# Patient Record
Sex: FEMALE | Race: WHITE | Employment: FULL TIME | URBAN - METROPOLITAN AREA
[De-identification: names, ages, dates, MRNs, and addresses within clinical notes are randomized per-mention and may not be internally consistent; named-entity substitution may affect disease eponyms.]

---

## 2018-05-19 ENCOUNTER — HOSPITAL ENCOUNTER (OUTPATIENT)
Dept: MAMMOGRAPHY | Age: 53
Discharge: HOME | End: 2018-05-19
Attending: PHYSICIAN ASSISTANT
Payer: COMMERCIAL

## 2018-05-19 DIAGNOSIS — Z12.31 ENCOUNTER FOR SCREENING MAMMOGRAM FOR BREAST CANCER: ICD-10-CM

## 2018-05-19 PROCEDURE — 77067 SCR MAMMO BI INCL CAD: CPT

## 2018-05-31 ENCOUNTER — HOSPITAL ENCOUNTER (OUTPATIENT)
Dept: MAMMOGRAPHY | Age: 53
Discharge: HOME | End: 2018-05-31
Attending: PHYSICIAN ASSISTANT
Payer: COMMERCIAL

## 2018-05-31 DIAGNOSIS — R92.8 ABNORMAL SCREENING MAMMOGRAM: ICD-10-CM

## 2018-05-31 PROCEDURE — 76642 ULTRASOUND BREAST LIMITED: CPT

## 2018-05-31 PROCEDURE — 77065 DX MAMMO INCL CAD UNI: CPT

## 2019-05-09 ENCOUNTER — ANESTHESIA EVENT (OUTPATIENT)
Dept: ENDOSCOPY | Age: 54
End: 2019-05-09
Payer: COMMERCIAL

## 2019-05-09 RX ORDER — SODIUM CHLORIDE, SODIUM LACTATE, POTASSIUM CHLORIDE, CALCIUM CHLORIDE 600; 310; 30; 20 MG/100ML; MG/100ML; MG/100ML; MG/100ML
100 INJECTION, SOLUTION INTRAVENOUS CONTINUOUS
Status: CANCELLED | OUTPATIENT
Start: 2019-05-09

## 2019-05-10 ENCOUNTER — HOSPITAL ENCOUNTER (OUTPATIENT)
Age: 54
Setting detail: OUTPATIENT SURGERY
Discharge: HOME | End: 2019-05-10
Attending: SURGERY | Admitting: SURGERY
Payer: COMMERCIAL

## 2019-05-10 ENCOUNTER — ANESTHESIA (OUTPATIENT)
Dept: ENDOSCOPY | Age: 54
End: 2019-05-10
Payer: COMMERCIAL

## 2019-05-10 VITALS
SYSTOLIC BLOOD PRESSURE: 148 MMHG | TEMPERATURE: 98.6 F | RESPIRATION RATE: 16 BRPM | BODY MASS INDEX: 31.39 KG/M2 | WEIGHT: 200 LBS | HEIGHT: 67 IN | DIASTOLIC BLOOD PRESSURE: 75 MMHG | OXYGEN SATURATION: 100 % | HEART RATE: 47 BPM

## 2019-05-10 PROCEDURE — 74011250636 HC RX REV CODE- 250/636

## 2019-05-10 PROCEDURE — 74011250636 HC RX REV CODE- 250/636: Performed by: ANESTHESIOLOGY

## 2019-05-10 PROCEDURE — 76040000025: Performed by: SURGERY

## 2019-05-10 PROCEDURE — 76060000032 HC ANESTHESIA 0.5 TO 1 HR: Performed by: SURGERY

## 2019-05-10 PROCEDURE — 77030009426 HC FCPS BIOP ENDOSC BSC -B: Performed by: SURGERY

## 2019-05-10 PROCEDURE — 88305 TISSUE EXAM BY PATHOLOGIST: CPT

## 2019-05-10 RX ORDER — SODIUM CHLORIDE, SODIUM LACTATE, POTASSIUM CHLORIDE, CALCIUM CHLORIDE 600; 310; 30; 20 MG/100ML; MG/100ML; MG/100ML; MG/100ML
100 INJECTION, SOLUTION INTRAVENOUS CONTINUOUS
Status: DISCONTINUED | OUTPATIENT
Start: 2019-05-10 | End: 2019-05-10 | Stop reason: HOSPADM

## 2019-05-10 RX ORDER — PROPOFOL 10 MG/ML
INJECTION, EMULSION INTRAVENOUS
Status: DISCONTINUED | OUTPATIENT
Start: 2019-05-10 | End: 2019-05-10 | Stop reason: HOSPADM

## 2019-05-10 RX ORDER — LIDOCAINE HYDROCHLORIDE 20 MG/ML
INJECTION, SOLUTION EPIDURAL; INFILTRATION; INTRACAUDAL; PERINEURAL AS NEEDED
Status: DISCONTINUED | OUTPATIENT
Start: 2019-05-10 | End: 2019-05-10 | Stop reason: HOSPADM

## 2019-05-10 RX ORDER — PROPOFOL 10 MG/ML
INJECTION, EMULSION INTRAVENOUS AS NEEDED
Status: DISCONTINUED | OUTPATIENT
Start: 2019-05-10 | End: 2019-05-10 | Stop reason: HOSPADM

## 2019-05-10 RX ADMIN — LIDOCAINE HYDROCHLORIDE 60 MG: 20 INJECTION, SOLUTION EPIDURAL; INFILTRATION; INTRACAUDAL; PERINEURAL at 12:32

## 2019-05-10 RX ADMIN — SODIUM CHLORIDE, SODIUM LACTATE, POTASSIUM CHLORIDE, AND CALCIUM CHLORIDE 100 ML/HR: 600; 310; 30; 20 INJECTION, SOLUTION INTRAVENOUS at 12:21

## 2019-05-10 RX ADMIN — PROPOFOL 200 MCG/KG/MIN: 10 INJECTION, EMULSION INTRAVENOUS at 12:32

## 2019-05-10 RX ADMIN — PROPOFOL 50 MG: 10 INJECTION, EMULSION INTRAVENOUS at 12:32

## 2019-05-10 NOTE — ROUTINE PROCESS
VSS at discharge. No complaints noted. Education reviewed and signed with patient and partner. Pt discharged via wheelchair by sesar Watson. Patient to be driven home by partner.

## 2019-05-10 NOTE — H&P
History and Physical 
 
Patient: Александр Morris Physician: Shilpa Renner MD 
 
Referring Physician: KAVITHA Miles Chief Complaint: For colonoscopy History of Present Illness: Pt presents for colonoscopy. Initial screening. History: 
Past Medical History:  
Diagnosis Date  H/O tooth extraction 04/29/2019  
 and bone graft for possible implant at a later date. Finished Amoxicillin 5/8/19 and no longer taking Hydrocodone for pain. Past Surgical History:  
Procedure Laterality Date  HX CYST INCISION AND DRAINAGE Right   
 breast cyst, U/S 10/2009  HX HEENT  04/30/2019  
 oral surgery (tooth extraction, bone graft for possible implant) Social History Socioeconomic History  Marital status: SINGLE Spouse name: Not on file  Number of children: Not on file  Years of education: Not on file  Highest education level: Not on file Tobacco Use  Smoking status: Never Smoker  Smokeless tobacco: Never Used Substance and Sexual Activity  Alcohol use: Yes Comment: a beer or glass of wine on weekend (or none at all)  Drug use: Never Family History Problem Relation Age of Onset  Hypertension Father  Diabetes Father 71  
 Heart Disease Father 79  Cancer Father   
     prostate cancer  Diabetes Sister  Hypertension Sister  Cancer Maternal Grandmother   
     ovarian cancer  Breast Cancer Paternal Grandfather   
     and prostate cancer  Breast Cancer Paternal Aunt Medications:  
Prior to Admission medications Medication Sig Start Date End Date Taking? Authorizing Provider  
cetirizine (ZYRTEC) 10 mg tablet Take 10 mg by mouth nightly. Yes Provider, Historical  
ibuprofen (MOTRIN) 200 mg tablet Take  by mouth as needed for Pain. Provider, Historical  
 
 
Allergies: No Known Allergies Physical Exam:  
 
Vital Signs:  
Visit Vitals /84 Pulse (!) 52 Temp 99.2 °F (37.3 °C) Resp 16  
 Ht 5' 7\" (1.702 m) Wt 200 lb (90.7 kg) SpO2 98% BMI 31.32 kg/m² Mliss Rausch General: no distress Heart: regular Lungs: unlabored Abdominal: soft Neurological: Grossly normal  
  
 
Findings/Diagnosis: Screening for colorectal cancer Plan of Care/Planned Procedure: Colonoscopy, possible polypectomy. Pt/designee has reviewed the colonoscopy information sheet. Any questions have been discussed. They agree to proceed. Signed: 
Kemar Vega MD 
 5/10/2019

## 2019-05-10 NOTE — ANESTHESIA PREPROCEDURE EVALUATION
Relevant Problems No relevant active problems Anesthetic History No history of anesthetic complications Review of Systems / Medical History Pertinent labs reviewed Pulmonary Within defined limits Neuro/Psych Within defined limits Cardiovascular Within defined limits Exercise tolerance: >4 METS 
  
GI/Hepatic/Renal 
Within defined limits Endo/Other Obesity Other Findings Physical Exam 
 
Airway Mallampati: I 
TM Distance: 4 - 6 cm Neck ROM: normal range of motion Mouth opening: Normal 
 
 Cardiovascular Regular rate and rhythm,  S1 and S2 normal,  no murmur, click, rub, or gallop Dental 
No notable dental hx 
 
Comments: Recent tooth extraction. Pulmonary Breath sounds clear to auscultation Abdominal 
GI exam deferred Other Findings Anesthetic Plan ASA: 2 Anesthesia type: total IV anesthesia Induction: Intravenous Anesthetic plan and risks discussed with: Patient

## 2019-05-10 NOTE — DISCHARGE INSTRUCTIONS
Radha Gold M.D.  [phone]    Instructions following colonoscopy:    ACTIVITY:   Resume usual, basic activities around the house today.  You may be light-headed or sleepy from anesthesia, so be careful going up and down stairs.  Avoid driving, operating machinery, or signing documents for 24 hours. DIET:   No restriction. Please note, some people may have nausea or cramps after this procedure which can result in an upset stomach after eating.  Many people have loose stools or diarrhea immediately after colonoscopy. It is also not uncommon to not have a bowel movement for 2-3 days. PAIN:   Some cramping or gas pain is normal after colonoscopy. However, if you experience worsening pain over the course of the day, or pain with associated fever please call the office immediately      [address] IF:   You have a temperature higher than 101.5° Fahrenheit for more than 6 hours.  You have severe nausea or vomiting not relieved by medication; or diarrhea. Please continue home medications as previously prescribed.     Repeat colonoscopy in 5-10 years depending on lab results (office will follow-up)

## 2019-05-10 NOTE — ANESTHESIA POSTPROCEDURE EVALUATION
Procedure(s): 
COLONOSCOPY/ 32 
ENDOSCOPIC POLYPECTOMY. total IV anesthesia Anesthesia Post Evaluation Patient location during evaluation: PACU Patient participation: complete - patient participated Level of consciousness: awake Pain management: satisfactory to patient Airway patency: patent Anesthetic complications: no 
Cardiovascular status: hemodynamically stable Respiratory status: spontaneous ventilation Hydration status: euvolemic Post anesthesia nausea and vomiting:  none Vitals Value Taken Time /75 5/10/2019  1:31 PM  
Temp 37 °C (98.6 °F) 5/10/2019 12:59 PM  
Pulse 47 5/10/2019  1:31 PM  
Resp 16 5/10/2019  1:31 PM  
SpO2 100 % 5/10/2019  1:31 PM

## 2019-05-10 NOTE — PROCEDURES
Procedure in Detail:  Informed consent was obtained for the procedure. The patient was placed in the left lateral decubitus position and sedation was induced by anesthesia. The [id number] was inserted into the rectum and advanced under direct vision to the cecum, which was identified by the ileocecal valve and appendiceal orifice. The quality of the colonic preparation was excellent. A careful inspection was made as the colonoscope was withdrawn, including a retroflexed view of the rectum; findings and interventions are described below. Appropriate photodocumentation was obtained. Findings:   Rectum:   Normal  Sigmoid:   Normal  Descending Colon:   Normal  Transverse Colon:   Normal  Ascending Colon:     - Protruding lesions:     -Sessile Polyp(s) size 3 mm removed by polypectomy (hot biopsy)  Cecum:   Normal          Specimens: Specimens were collected and sent to pathology. Complications: None; patient tolerated the procedure well. \    EBL - none    Recommendations:   - Await pathology. - If adenoma is present, repeat colonoscopy in 5 years.      Signed By: Horacio Jorge MD                        May 10, 2019

## 2019-06-03 ENCOUNTER — HOSPITAL ENCOUNTER (OUTPATIENT)
Dept: MAMMOGRAPHY | Age: 54
Discharge: HOME | End: 2019-06-03
Attending: PHYSICIAN ASSISTANT
Payer: COMMERCIAL

## 2019-06-03 DIAGNOSIS — Z12.31 ENCOUNTER FOR SCREENING MAMMOGRAM FOR BREAST CANCER: ICD-10-CM

## 2019-06-03 PROCEDURE — 77067 SCR MAMMO BI INCL CAD: CPT

## 2020-06-24 ENCOUNTER — HOSPITAL ENCOUNTER (OUTPATIENT)
Dept: MAMMOGRAPHY | Age: 55
Discharge: HOME | End: 2020-06-24
Attending: PHYSICIAN ASSISTANT
Payer: COMMERCIAL

## 2020-06-24 DIAGNOSIS — Z12.31 SCREENING MAMMOGRAM FOR HIGH-RISK PATIENT: ICD-10-CM

## 2020-06-24 PROCEDURE — 77063 BREAST TOMOSYNTHESIS BI: CPT

## 2022-03-08 ENCOUNTER — HOSPITAL ENCOUNTER (OUTPATIENT)
Dept: PHYSICAL THERAPY | Age: 57
Discharge: HOME | End: 2022-03-08
Payer: COMMERCIAL

## 2022-03-08 PROCEDURE — 97161 PT EVAL LOW COMPLEX 20 MIN: CPT

## 2022-03-08 PROCEDURE — 97110 THERAPEUTIC EXERCISES: CPT

## 2022-03-08 NOTE — THERAPY EVALUATION
Maicol Torres  : 1965 [address] [place] [address].  [phone]   [phone]        OUTPATIENT PHYSICAL THERAPY:Initial Assessment 3/8/2022         ICD-10: Treatment Diagnosis: Pain in right knee (M25.561) and Stiffness of right knee, not elsewhere classified (M25.661) and Difficulty in walking, not elsewhere classified (R26.2)  Precautions/Allergies:   Patient has no known allergies. Fall Risk Score:     Ambulatory/Rehab Services H2 Model Falls Risk Assessment    Risk Factors:       No Risk Factors Identified Ability to Rise from Chair:       (0)  Ability to rise in a single movement    Falls Prevention Plan:       No modifications necessary   Total: (5 or greater = High Risk): 0     [facility] [place]. All Rights Reserved. [facility] Patent #[id number]. Federal Law prohibits the replication, distribution or use without written permission from [facility] of [address] [place] [address]     MD Orders: Direct access for Physical Therapy MEDICAL/REFERRING DIAGNOSIS:  Pain in right knee [M25.561]   DATE OF ONSET: 2022  REFERRING PHYSICIAN: Referred, Self, MD  RETURN PHYSICIAN APPOINTMENT: n/a     INITIAL ASSESSMENT:  Ms. 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 Pain is currently diminishing but prone to exacerbations. She comes to PT to resolve these symptoms and requires skilled PT to restore normal knee function. PROBLEM LIST (Impacting functional limitations):  1. Decreased Strength  2. Decreased ADL/Functional Activities  3. Decreased Ambulation Ability/Technique  4. Increased Pain  5. Decreased Flexibility/Joint Mobility  6. Decreased [place] with Home Exercise Program INTERVENTIONS PLANNED:  1. Cryotherapy  2. Electrical Stimulation  3. Home Exercise Program (HEP)  4. Manual Therapy  5.  Range of Motion (ROM)  6. Therapeutic Activites  7. Therapeutic Exercise/Strengthening    TREATMENT PLAN:  Effective Dates: 3/8/2022 TO 4/7/2022 (30 days). Frequency/Duration: 2 times a week for 30 Days  GOALS: (Goals have been discussed and agreed upon with patient.)  Short-Term Functional Goals: Time Frame: 2 weeks  1. Independent performance of home exercise program  2. Increase R knee flexion to 145 deg flexion for normal squat capacity  3. Pt to demonstrate normal/symmetrical squat technique. Discharge Goals: Time Frame: 4 weeks  1. Improve R quadriceps and calf strength 25% to allow more symmetrical squat capacity for lifting  2. Single leg balance 45 seconds or better for knee stance stability  3. Improve LEFS score to 65/80 or bettter to reflect normal age appropriate knee capacity  Rehabilitation Potential For Stated Goals: Good  Regarding Radha Genao's therapy, I certify that the treatment plan above will be carried out by a therapist or under their direction. Thank you for this referral,  Drew Macdonald PT       Referring Physician Signature: Referred, Self, MD              Date                      HISTORY:   History of Present Injury/Illness (Reason for Referral):  Pt reports developing right posterolateral knee pain in early January 2022 - this was the result of repetitive squatting while remodeling a bathroom in her home. Pain led her to a orthopedic surgical consult, with her having been diagnosed with a hamstring strain. Since the last week, her symptoms have lessened considerably but are prone to flare-up if she pushes her activities. She notes that she has had difficulty with stair descent (descends with a step-to pattern). She comes to PT to resolve these symptoms and restore more normal knee function. Past Medical History/Comorbidities:   Ms. Carey Cruz  has a past medical history of H/O tooth extraction (04/29/2019).     She has no past medical history of Aneurysm ([address] Arrhythmia, Arthritis, Asthma, Autoimmune disease ([facility] CAD (coronary artery disease), Cancer ([facility] [address] [facility] Chronic kidney disease, Chronic obstructive pulmonary disease ([facility] [address] Chronic pain, Coagulation disorder ([facility] [address] [facility] Diabetes ([address] [facility] [address] [facility] Endocarditis, GERD (gastroesophageal reflux disease), Heart failure ([facility] [address] [facility] Hypertension, Liver disease, Morbid obesity ([facility] [address] [facility] Psychiatric disorder, PUD (peptic ulcer disease), Rheumatic fever, Seizures ([facility] [address] [facility] Sleep apnea, Stroke ([facility] [address] [facility] Thromboembolus ([facility] [address] or Thyroid disease. Ms. Sunny Vasquez  has a past surgical history that includes hx cyst incision and drainage (Right); hx heent (04/30/2019); pr colsc flx w/removal lesion by hot bx forceps (5/10/2019); colonoscopy (N/A, 5/10/2019); and hx breast biopsy. Social History/Living Environment:     Pt . Home has only 3 steps at rear entrance  Prior Level of Function/Work/Activity:  Works as a , work requires driving. Hopes to resume fitness activities once knee feels better. Dominant Side:         RIGHT  Current Medications:  No current outpatient medications on file. Zyrted  Date Last Reviewed:  3/8/2022   # of Personal Factors/Comorbidities that affect the Plan of Care: 1-2: MODERATE COMPLEXITY   EXAMINATION:   Observation/Orthostatic Postural Assessment:    Pt stands with mild genu-valgus and bilateral knee hyperextension postures. Pelvic obliquity (left iliac crest lower). Palpation:    Mild medial joint line tenderness bilaterally. Right lateral gastrocnemius head/popliteal tenderness. ROM:    Knee flexion/extension: left = 145 to +5, right = 138 deg to +5 deg. Hamstring flexibility (SLR): left = 65 deg, right = 50 deg. Dorsiflexion rocker (knee flexed, weightbearing): left = 44 deg, right = 45 deg. Strength:   Single leg calf endurance: 10 reps each side, but with forward rock over toes. HHD: quadriceps: left = 26.7 kg, right = 17 kg. Hamstrings: left = 19.2 kg, right = 18 kg.    Special Tests:    Mild clicks with both left/right knee Deborah tests. Mild pseudolaxity with knee varus stressing L/R knees at 0 and 30 deg. Normal PF mobility. Neg. Bounce home, neg Lachmann tests bilat. Neurological Screen:  Normal LE light touch sensation bilat. Functional Mobility:   Performs sit - stand without hand assist. Performs half squat with weight shift to LLE. Balance:    Single leg balance = 15 secs each L/R, R side test performed with increased sway. Body Structures Involved:  1. Nerves  2. Bones  3. Joints  4. Muscles  5. Ligaments Body Functions Affected:  1. Sensory/Pain  2. Neuromusculoskeletal  3. Movement Related Activities and Participation Affected:  1. General Tasks and Demands  2. Mobility  3. Self Care  4. Domestic Life  5. Interpersonal Interactions and Relationships  6. Community, Social and [place]   # of elements that affect the Plan of Care: 1-2: LOW COMPLEXITY   CLINICAL PRESENTATION:   Presentation: Stable and uncomplicated: LOW COMPLEXITY   CLINICAL DECISION MAKING:   Tool Used: Lower Extremity Functional Scale (LEFS)  Score:  Initial: 53/80 Most Recent: X/80 (Date: -- )   Interpretation of Score: 20 questions each scored on a 5 point scale with 0 representing \"extreme difficulty or unable to perform\" and 4 representing \"no difficulty\". The lower the score, the greater the functional disability. 80/80 represents no disability. Minimal detectable change is 9 points. Medical Necessity:   · Patient is expected to demonstrate progress in strength, range of motion, balance and coordination  ·  to increase independence with gait, transfers, stair climbing and squats, restore normal ambulatory function. · .  Reason for Services/Other Comments:  · Patient has demonstrated an improvement in functional level by independent performance of HEP.    · .   Use of outcome tool(s) and clinical judgement create a POC that gives a: Clear prediction of patient's progress: LOW COMPLEXITY     Total Treatment Duration:  PT Patient Time In/Time Out  Time In: 0800  Time Out: [address] [place] [address] [address], PT

## 2022-03-08 NOTE — PROGRESS NOTES
Hanh Zambrano  : 1965  Primary: [employer]  Secondary:  [address] @ [address], [place].  [phone]   [phone]      OUTPATIENT PHYSICAL THERAPY: Daily Treatment Note  3/8/2022   ICD-10: Treatment Diagnosis: Pain in right knee (M25.561) and Stiffness of right knee, not elsewhere classified (M25.661) and Difficulty in walking, not elsewhere classified (R26.2)  MEDICAL/REFERRING DIAGNOSIS:  Pain in right knee [M25.561]    TREATMENT PLAN:  Effective Dates: 3/8/2022 TO 2022 (30 days). Frequency/Duration: 2 times a week for 30 Days  GOALS: (Goals have been discussed and agreed upon with patient.)  Short-Term Functional Goals: Time Frame: 2 weeks  1. Independent performance of home exercise program  2. Increase R knee flexion to 145 deg flexion for normal squat capacity  3. Pt to demonstrate normal/symmetrical squat technique. Discharge Goals: Time Frame: 4 weeks  1. Improve R quadriceps and calf strength 25% to allow more symmetrical squat capacity for lifting  2. Single leg balance 45 seconds or better for knee stance stability  3. Improve LEFS score to 65/80 or bettter to reflect normal age appropriate knee capacity. _________________________________________________________________________  Pre-treatment Symptoms/Complaints:  See today's initial evaluation report. Pain: Initial: 2/10 Post Session:  No increase/10   Medications Last Reviewed:  3/8/2022  Updated Objective Findings:  Below measures from initial evaluation unless otherwise noted. Observation/Orthostatic Postural Assessment:    Pt stands with mild genu-valgus and bilateral knee hyperextension postures. Pelvic obliquity (left iliac crest lower). Palpation:    Mild medial joint line tenderness bilaterally. Right lateral gastrocnemius head/popliteal tenderness. ROM:    Knee flexion/extension: left = 145 to +5, right = 138 deg to +5 deg. Hamstring flexibility (SLR): left = 65 deg, right = 50 deg. Dorsiflexion rocker (knee flexed, weightbearing): left = 44 deg, right = 45 deg. Strength:   Single leg calf endurance: 10 reps each side, but with forward rock over toes. HHD: quadriceps: left = 26.7 kg, right = 17 kg. Hamstrings: left = 19.2 kg, right = 18 kg. Special Tests:    Mild clicks with both left/right knee Deborah tests. Mild pseudolaxity with knee varus stressing L/R knees at 0 and 30 deg. Normal PF mobility. Neg. Bounce home, neg Lachmann tests bilat. Neurological Screen:  Normal LE light touch sensation bilat. Functional Mobility:   Performs sit - stand without hand assist. Performs half squat with weight shift to LLE. Balance:    Single leg balance = 15 secs each L/R, R side test performed with increased sway. TREATMENT:   THERAPEUTIC ACTIVITY: ( see below for minutes): Therapeutic activities per grid below to improve mobility, strength, balance and coordination. Required minimal visual, verbal, manual and tactile cues to improve independence and safety with daily activities . THERAPEUTIC EXERCISE: (see below for minutes):  Exercises per grid below to improve mobility, strength, balance and coordination. Required minimal verbal and manual cues to promote proper body alignment, promote proper body posture and promote proper body mechanics. Progressed resistance, range, repetitions and complexity of movement as indicated. MANUAL THERAPY: (see below for minutes): Joint mobilization and Soft tissue mobilization was utilized and necessary because of the patient's restricted joint motion, painful spasm, loss of articular motion and restricted motion of soft tissue. MODALITIES: (see below for minutes):      to decrease pain    SELF CARE: (see below for minutes): Procedure(s) (per grid) utilized to improve and/or restore self-care/home management as related to dressing, bathing and grooming.  Required minimal verbal cueing to facilitate activities of daily living skills and compensatory activities. Date: 3/8/22 (visit 1)       Modalities:                                Therapeutic Exercise: 23 min        Heel slide knee flexion stretch: 2 x 30\"         R HS strap stretch: 3 x 30\"        R SLR's 30x        R s/l hip abd: 30x        R TKE's: 15 x 5\" w/ ball press into wall        Single leg calf raises: 10x each L/R        R lunge stretch: 2 x30\"                                       Proprioceptive Activities:                                Manual Therapy: 4 min        STM R lateral gastroc head x 4 min               Therapeutic Activities:                                  HEP:Access Code: [email or web address]  URL: [email or web address]  Date: 03/08/2022  Prepared by: Rach Larose    Exercises  Supine Heel Slide with Strap - 1 x daily - 7 x weekly - 3 sets - 30 second hold  Supine Hamstring Stretch with Strap - 1 x daily - 7 x weekly - 3 sets - 10 reps  Gastroc Stretch on Wall - 1 x daily - 7 x weekly - 3 sets - 30 seconds hold  Active Straight Leg Raise with Quad Set - 1 x daily - 7 x weekly - 3 sets - 10 reps  Sidelying Hip Abduction - 1 x daily - 7 x weekly - 3 sets - 10 reps  Standing Terminal Knee Extension at Wall with Ball - 1 x daily - 7 x weekly - 3 sets - 10 reps  Single Leg Heel Raise with Unilateral Counter Support - 1 x daily - 7 x weekly - 3 sets - 10 reps      [employer]  Treatment/Session Summary:    · Response to Treatment:  Patient is independent with performance of above described home program.  · Communication/Consultation:  None today  · Equipment provided today:  None today  · Recommendations/Intent for next treatment session: Next visit will focus on End range knee flexion ROM, OKC/CKC program for quad/ham, hip and calf strength, single leg balance training. Modalities and manl therapy to ease calf soreness.  .    Total Treatment Billable Duration:  50 min  PT Patient Time In/Time Out  Time In: 0800  Time Out: [address], PT    Future Appointments   Date Time Provider Clifford Kristy   3/10/2022  8:45 AM [facility]   3/15/2022 10:15 AM Jenna Sanon, PT [facility]   3/17/2022  9:30 AM Jenna Sanon, PT [facility]   3/22/2022  9:30 AM Jenna Sanon, PT [facility]   3/24/2022  9:30 AM Jenna Sanon, PT [facility]   3/29/2022  9:30 AM Jenna Sanon, PT [facility]   3/31/2022  9:30 AM Irineo Hernández, PT [facility]

## 2022-03-10 ENCOUNTER — HOSPITAL ENCOUNTER (OUTPATIENT)
Dept: PHYSICAL THERAPY | Age: 57
Discharge: HOME | End: 2022-03-10
Payer: COMMERCIAL

## 2022-03-10 PROCEDURE — 97014 ELECTRIC STIMULATION THERAPY: CPT

## 2022-03-10 PROCEDURE — 97110 THERAPEUTIC EXERCISES: CPT

## 2022-03-10 PROCEDURE — 97140 MANUAL THERAPY 1/> REGIONS: CPT

## 2022-03-10 NOTE — PROGRESS NOTES
Hanh Zambrano  : 1965  Primary: [facility]  Secondary:  [address] @ [address]  [phone]   [phone]      OUTPATIENT PHYSICAL THERAPY: Daily Treatment Note  3/10/2022   ICD-10: Treatment Diagnosis: Pain in right knee (M25.561) and Stiffness of right knee, not elsewhere classified (M25.661) and Difficulty in walking, not elsewhere classified (R26.2)  MEDICAL/REFERRING DIAGNOSIS:  Pain in right knee [M25.561]    TREATMENT PLAN:  Effective Dates: 3/8/2022 TO 2022 (30 days). Frequency/Duration: 2 times a week for 30 Days  GOALS: (Goals have been discussed and agreed upon with patient.)  Short-Term Functional Goals: Time Frame: 2 weeks  1. Independent performance of home exercise program  2. Increase R knee flexion to 145 deg flexion for normal squat capacity  3. Pt to demonstrate normal/symmetrical squat technique. Discharge Goals: Time Frame: 4 weeks  1. Improve R quadriceps and calf strength 25% to allow more symmetrical squat capacity for lifting  2. Single leg balance 45 seconds or better for knee stance stability  3. Improve LEFS score to 65/80 or bettter to reflect normal age appropriate knee capacity. _________________________________________________________________________  Pre-treatment Symptoms/Complaints: Pt noting mild improvement, persisting popliteal/lateral calf soreness. Pain: Initial: 2/10 Post Session:  No increase/10   Medications Last Reviewed:  3/10/2022  Updated Objective Findings:  Below measures from initial evaluation unless otherwise noted. Observation/Orthostatic Postural Assessment:    Pt stands with mild genu-valgus and bilateral knee hyperextension postures. Pelvic obliquity (left iliac crest lower). Palpation:    Mild medial joint line tenderness bilaterally. Right lateral gastrocnemius head/popliteal tenderness.    ROM:    Knee flexion/extension: left = 145 to +5, right = 138 deg to +5 deg. Hamstring flexibility (SLR): left = 65 deg, right = 50 deg. Dorsiflexion rocker (knee flexed, weightbearing): left = 44 deg, right = 45 deg. Strength:   Single leg calf endurance: 10 reps each side, but with forward rock over toes. HHD: quadriceps: left = 26.7 kg, right = 17 kg. Hamstrings: left = 19.2 kg, right = 18 kg. Special Tests:    Mild clicks with both left/right knee Deborah tests. Mild pseudolaxity with knee varus stressing L/R knees at 0 and 30 deg. Normal PF mobility. Neg. Bounce home, neg Lachmann tests bilat. Neurological Screen:  Normal LE light touch sensation bilat. Functional Mobility:   Performs sit - stand without hand assist. Performs half squat with weight shift to LLE. Balance:    Single leg balance = 15 secs each L/R, R side test performed with increased sway. TREATMENT:   THERAPEUTIC ACTIVITY: ( see below for minutes): Therapeutic activities per grid below to improve mobility, strength, balance and coordination. Required minimal visual, verbal, manual and tactile cues to improve independence and safety with daily activities . THERAPEUTIC EXERCISE: (see below for minutes):  Exercises per grid below to improve mobility, strength, balance and coordination. Required minimal verbal and manual cues to promote proper body alignment, promote proper body posture and promote proper body mechanics. Progressed resistance, range, repetitions and complexity of movement as indicated. MANUAL THERAPY: (see below for minutes): Joint mobilization and Soft tissue mobilization was utilized and necessary because of the patient's restricted joint motion, painful spasm, loss of articular motion and restricted motion of soft tissue. MODALITIES: (see below for minutes):      to decrease pain    SELF CARE: (see below for minutes): Procedure(s) (per grid) utilized to improve and/or restore self-care/home management as related to dressing, bathing and grooming. Required minimal verbal cueing to facilitate activities of daily living skills and compensatory activities. Date: 3/8/22 (visit 1) 3/10/22 (visit 2)       Modalities:  15 min        R popliteal, distal lateral HS, lateral calf IFC/ice x 15 min,  pps : 15 min                      Therapeutic Exercise: 23 min 30 min       Heel slide knee flexion stretch: 2 x 30\"  Heel slide knee flexion stretch: 3 x 30\"        R HS strap stretch: 3 x 30\" R hamstring stretch 3 x 30        R SLR's 30x Ankle pumps x 30       R s/l hip abd: 30x Supine strap calf stretch 3 x 30\"       R TKE's: 15 x 5\" w/ ball press into wall SB hamstring curls (no bridge): 3 x 10        Single leg calf raises: 10x each L/R LAQ's dl; 37.5#, 3x8       R lunge stretch: 2 x30\" DL hamstring curls : 3 x 8 @ 37.5#        Dl calf raises off back of slant board; 2 x 10         Slantboard stretch x 2 min                      Proprioceptive Activities:                                Manual Therapy: 4 min 10  min       STM R lateral gastroc head x 4 min STM: lateral gastroc-popliteal- distal biceps fem. : 7  min        Seated R tib-fem distraction/ap glides gr. 3: 3 min      Therapeutic Activities:                                  HEP:Access Code: [email or web address]  URL: [email or web address]  Date: 03/08/2022  Prepared by: Shayla Salgado    Exercises  Supine Heel Slide with Strap - 1 x daily - 7 x weekly - 3 sets - 30 second hold  Supine Hamstring Stretch with Strap - 1 x daily - 7 x weekly - 3 sets - 10 reps  Gastroc Stretch on Wall - 1 x daily - 7 x weekly - 3 sets - 30 seconds hold  Active Straight Leg Raise with Quad Set - 1 x daily - 7 x weekly - 3 sets - 10 reps  Sidelying Hip Abduction - 1 x daily - 7 x weekly - 3 sets - 10 reps  Standing Terminal Knee Extension at Wall with Ball - 1 x daily - 7 x weekly - 3 sets - 10 reps  Single Leg Heel Raise with Unilateral Counter Support - 1 x daily - 7 x weekly - 3 sets - 10 reps      [employer]  Treatment/Session Summary:    · Response to Treatment:  Full knee flexion ROM today. Progressed pt w/ OKC exercises without difficulty. · Communication/Consultation:  None today  · Equipment provided today:  None today  · Recommendations/Intent for next treatment session: Next visit will focus on End range knee flexion ROM, OKC/CKC program for quad/ham, hip and calf strength, single leg balance training. Modalities and manl therapy to ease calf soreness.  .    Total Treatment Billable Duration:  55 min  PT Patient Time In/Time Out  Time In: 0845  Time Out: [address], PT    Future Appointments   Date Time Provider Clifford Wagner   3/15/2022 10:15 AM Vivi Salcido, PT [facility]   3/17/2022  9:30 AM Vivi Salcido, PT [facility]   3/22/2022  9:30 AM Vivi Salcido, PT [facility]   3/24/2022  9:30 AM Vivi Salcido, PT [facility]   3/29/2022  9:30 AM Vivi Salcido, PT [facility]   3/31/2022  9:30 AM Dominick De La Rosa, PT [facility]

## 2022-03-15 ENCOUNTER — HOSPITAL ENCOUNTER (OUTPATIENT)
Dept: PHYSICAL THERAPY | Age: 57
Discharge: HOME | End: 2022-03-15
Payer: COMMERCIAL

## 2022-03-15 PROCEDURE — 97014 ELECTRIC STIMULATION THERAPY: CPT

## 2022-03-15 PROCEDURE — 97110 THERAPEUTIC EXERCISES: CPT

## 2022-03-15 PROCEDURE — 97140 MANUAL THERAPY 1/> REGIONS: CPT

## 2022-03-15 NOTE — PROGRESS NOTES
Aura Osborn  : 1965  Primary: [facility]  Secondary:  [address] [facility] @ [address], [address] [place] [address]  [phone]   [phone]      OUTPATIENT PHYSICAL THERAPY: Daily Treatment Note  3/15/2022   ICD-10: Treatment Diagnosis: Pain in right knee (M25.561) and Stiffness of right knee, not elsewhere classified (M25.661) and Difficulty in walking, not elsewhere classified (R26.2)  MEDICAL/REFERRING DIAGNOSIS:  Pain in right knee [M25.561]    TREATMENT PLAN:  Effective Dates: 3/8/2022 TO 2022 (30 days). Frequency/Duration: 2 times a week for 30 Days  GOALS: (Goals have been discussed and agreed upon with patient.)  Short-Term Functional Goals: Time Frame: 2 weeks  1. Independent performance of home exercise program  2. Increase R knee flexion to 145 deg flexion for normal squat capacity  3. Pt to demonstrate normal/symmetrical squat technique. Discharge Goals: Time Frame: 4 weeks  1. Improve R quadriceps and calf strength 25% to allow more symmetrical squat capacity for lifting  2. Single leg balance 45 seconds or better for knee stance stability  3. Improve LEFS score to 65/80 or bettter to reflect normal age appropriate knee capacity. _________________________________________________________________________  Pre-treatment Symptoms/Complaints: Was travelling over the weekend, drove to-from [place], so wasn't able to exercise as much w/ her HEP. Pain: Initial: 2/10 Post Session:  No increase/10   Medications Last Reviewed:  3/15/2022  Updated Objective Findings:  Below measures from initial evaluation unless otherwise noted. Observation/Orthostatic Postural Assessment:    Pt stands with mild genu-valgus and bilateral knee hyperextension postures. Pelvic obliquity (left iliac crest lower). Palpation:    Mild medial joint line tenderness bilaterally. Right lateral gastrocnemius head/popliteal tenderness.    ROM:    Knee flexion/extension: left = 145 to +5, right = 138 deg to +5 deg. Hamstring flexibility (SLR): left = 65 deg, right = 50 deg. Dorsiflexion rocker (knee flexed, weightbearing): left = 44 deg, right = 45 deg. Strength:   Single leg calf endurance: 10 reps each side, but with forward rock over toes. HHD: quadriceps: left = 26.7 kg, right = 17 kg. Hamstrings: left = 19.2 kg, right = 18 kg. Special Tests:    Mild clicks with both left/right knee Deborah tests. Mild pseudolaxity with knee varus stressing L/R knees at 0 and 30 deg. Normal PF mobility. Neg. Bounce home, neg Lachmann tests bilat. Neurological Screen:  Normal LE light touch sensation bilat. Functional Mobility:   Performs sit - stand without hand assist. Performs half squat with weight shift to LLE. Balance:    Single leg balance = 15 secs each L/R, R side test performed with increased sway. TREATMENT:   THERAPEUTIC ACTIVITY: ( see below for minutes): Therapeutic activities per grid below to improve mobility, strength, balance and coordination. Required minimal visual, verbal, manual and tactile cues to improve independence and safety with daily activities . THERAPEUTIC EXERCISE: (see below for minutes):  Exercises per grid below to improve mobility, strength, balance and coordination. Required minimal verbal and manual cues to promote proper body alignment, promote proper body posture and promote proper body mechanics. Progressed resistance, range, repetitions and complexity of movement as indicated. MANUAL THERAPY: (see below for minutes): Joint mobilization and Soft tissue mobilization was utilized and necessary because of the patient's restricted joint motion, painful spasm, loss of articular motion and restricted motion of soft tissue.    MODALITIES: (see below for minutes):      to decrease pain    SELF CARE: (see below for minutes): Procedure(s) (per grid) utilized to improve and/or restore self-care/home management as related to dressing, bathing and grooming. Required minimal verbal cueing to facilitate activities of daily living skills and compensatory activities. Date: 3/8/22 (visit 1) 3/10/22 (visit 2)  3/15/22 (visit 3)     Modalities:  15 min 15 min       R popliteal, distal lateral HS, lateral calf IFC/ice x 15 min,  pps : 15 min R popliteal, distal lateral HS, lateral calf IFC/ice x 15 min,  pps : 15 min                     Therapeutic Exercise: 23 min 30 min 32 min      Heel slide knee flexion stretch: 2 x 30\"  Heel slide knee flexion stretch: 3 x 30\"  Quad sets: 15 x 5\"       R HS strap stretch: 3 x 30\" R hamstring stretch 3 x 30  SB hamstring curls: 30x       R SLR's 30x Ankle pumps x 30 Supine strap calf stretch 3 x 30\"      R s/l hip abd: 30x Supine strap calf stretch 3 x 30\" R hamstring stretch 3 x 30       R TKE's: 15 x 5\" w/ ball press into wall SB hamstring curls (no bridge): 3 x 10  DL hamstring curls: 3 x 10 @ 42.5#.       Single leg calf raises: 10x each L/R LAQ's dl; 37.5#, 3x8 LAQ's DL: 3 x 10 @ 42.5#      R lunge stretch: 2 x30\" DL hamstring curls : 3 x 8 @ 37.5# Calf raises off back of slantboard: 3 x 10        Dl calf raises off back of slant board; 2 x 10  Slantboard stretch x 2 min       Slantboard stretch x 2 min Tandem balance, 4 x 30\" (2x each L/R leading). Proprioceptive Activities:                                Manual Therapy: 4 min 10  min 10 min. STM R lateral gastroc head x 4 min STM: lateral gastroc-popliteal- distal biceps fem. : 7  min STM: lateral gastroc-popliteal- distal biceps fem. : 7  min       Seated R tib-fem distraction/ap glides gr. 3: 3 min Seated R tib-fem distraction/ap glides gr. 3: 3 min     Therapeutic Activities:                                  HEP:Access Code: [email or web address]  URL: [email or web address]  Date: 03/08/2022  Prepared by: Abdulkadir Alcaraz    Exercises  Supine Heel Slide with Strap - 1 x daily - 7 x weekly - 3 sets - 30 second hold  Supine Hamstring Stretch with Strap - 1 x daily - 7 x weekly - 3 sets - 10 reps  Gastroc Stretch on Wall - 1 x daily - 7 x weekly - 3 sets - 30 seconds hold  Active Straight Leg Raise with Quad Set - 1 x daily - 7 x weekly - 3 sets - 10 reps  Sidelying Hip Abduction - 1 x daily - 7 x weekly - 3 sets - 10 reps  Standing Terminal Knee Extension at Wall with Ball - 1 x daily - 7 x weekly - 3 sets - 10 reps  Single Leg Heel Raise with Unilateral Counter Support - 1 x daily - 7 x weekly - 3 sets - 10 reps      [employer] Portal  Treatment/Session Summary:    · Response to Treatment:  Further advancement of OKC exercises, ready for sit-stands/lateral step ups now. · Communication/Consultation:  None today  · Equipment provided today:  None today  · Recommendations/Intent for next treatment session: Next visit will focus on End range knee flexion ROM, OKC/CKC program for quad/ham, hip and calf strength, single leg balance training. Modalities and manl therapy to ease calf soreness.  .    Total Treatment Billable Duration:  57 min  PT Patient Time In/Time Out  Time In: 8655  Time Out: [facility], PT    Future Appointments   Date Time Provider Clifford Wagner   3/17/2022  9:30 AM Clarita Loya, PT [facility]   3/22/2022  9:30 AM Clarita Loya, PT [facility]   3/24/2022  9:30 AM Clarita Loya, PT [facility]   3/29/2022  9:30 AM Clarita Loya, PT [facility]   3/31/2022  9:30 AM Sabrina Garcia, PT [facility]

## 2022-03-17 ENCOUNTER — HOSPITAL ENCOUNTER (OUTPATIENT)
Dept: PHYSICAL THERAPY | Age: 57
Discharge: HOME | End: 2022-03-17
Payer: COMMERCIAL

## 2022-03-17 PROCEDURE — 97014 ELECTRIC STIMULATION THERAPY: CPT

## 2022-03-17 PROCEDURE — 97140 MANUAL THERAPY 1/> REGIONS: CPT

## 2022-03-17 PROCEDURE — 97110 THERAPEUTIC EXERCISES: CPT

## 2022-03-17 NOTE — PROGRESS NOTES
Demarioal Oms  : 1965  Primary: [facility]*  Secondary:  [address] @ [address], [address] [place] [address]  [phone]   [phone]      OUTPATIENT PHYSICAL THERAPY: Daily Treatment Note  3/17/2022   ICD-10: Treatment Diagnosis: Pain in right knee (M25.561) and Stiffness of right knee, not elsewhere classified (M25.661) and Difficulty in walking, not elsewhere classified (R26.2)  MEDICAL/REFERRING DIAGNOSIS:  Pain in right knee [M25.561]    TREATMENT PLAN:  Effective Dates: 3/8/2022 TO 2022 (30 days). Frequency/Duration: 2 times a week for 30 Days  GOALS: (Goals have been discussed and agreed upon with patient.)  Short-Term Functional Goals: Time Frame: 2 weeks  1. Independent performance of home exercise program  2. Increase R knee flexion to 145 deg flexion for normal squat capacity  3. Pt to demonstrate normal/symmetrical squat technique. Discharge Goals: Time Frame: 4 weeks  1. Improve R quadriceps and calf strength 25% to allow more symmetrical squat capacity for lifting  2. Single leg balance 45 seconds or better for knee stance stability  3. Improve LEFS score to 65/80 or bettter to reflect normal age appropriate knee capacity. _________________________________________________________________________  Pre-treatment Symptoms/Complaints: Less knee pain. Pain: Initial: 2/10 Post Session:  No increase/10   Medications Last Reviewed:  3/17/2022  Updated Objective Findings:  Below measures from initial evaluation unless otherwise noted. Observation/Orthostatic Postural Assessment:    Pt stands with mild genu-valgus and bilateral knee hyperextension postures. Pelvic obliquity (left iliac crest lower). Palpation:    Mild medial joint line tenderness bilaterally. Right lateral gastrocnemius head/popliteal tenderness. ROM:    Knee flexion/extension: left = 145 to +5, right = 138 deg to +5 deg.  Hamstring flexibility (SLR): left = 65 deg, right = 50 deg. Dorsiflexion rocker (knee flexed, weightbearing): left = 44 deg, right = 45 deg. Strength:   Single leg calf endurance: 10 reps each side, but with forward rock over toes. HHD: quadriceps: left = 26.7 kg, right = 17 kg. Hamstrings: left = 19.2 kg, right = 18 kg. Special Tests:    Mild clicks with both left/right knee Deborah tests. Mild pseudolaxity with knee varus stressing L/R knees at 0 and 30 deg. Normal PF mobility. Neg. Bounce home, neg Lachmann tests bilat. Neurological Screen:  Normal LE light touch sensation bilat. Functional Mobility:   Performs sit - stand without hand assist. Performs half squat with weight shift to LLE. Balance:    Single leg balance = 15 secs each L/R, R side test performed with increased sway. 3/17/22: strengths: quads: left = 21kg, right= 18kg. TREATMENT:   THERAPEUTIC ACTIVITY: ( see below for minutes): Therapeutic activities per grid below to improve mobility, strength, balance and coordination. Required minimal visual, verbal, manual and tactile cues to improve independence and safety with daily activities . THERAPEUTIC EXERCISE: (see below for minutes):  Exercises per grid below to improve mobility, strength, balance and coordination. Required minimal verbal and manual cues to promote proper body alignment, promote proper body posture and promote proper body mechanics. Progressed resistance, range, repetitions and complexity of movement as indicated. MANUAL THERAPY: (see below for minutes): Joint mobilization and Soft tissue mobilization was utilized and necessary because of the patient's restricted joint motion, painful spasm, loss of articular motion and restricted motion of soft tissue. MODALITIES: (see below for minutes):      to decrease pain    SELF CARE: (see below for minutes): Procedure(s) (per grid) utilized to improve and/or restore self-care/home management as related to dressing, bathing and grooming. Required minimal verbal cueing to facilitate activities of daily living skills and compensatory activities. Date: 3/8/22 (visit 1) 3/10/22 (visit 2)  3/15/22 (visit 3) 3/17/22 (visit 4)    Modalities:  15 min 15 min 15 min      R popliteal, distal lateral HS, lateral calf IFC/ice x 15 min,  pps : 15 min R popliteal, distal lateral HS, lateral calf IFC/ice x 15 min,  pps : 15 min R popliteal, distal lateral HS, lateral calf IFC/ice x 15 min,  pps : 15 min                    Therapeutic Exercise: 23 min 30 min 32 min 36 min     Heel slide knee flexion stretch: 2 x 30\"  Heel slide knee flexion stretch: 3 x 30\"  Quad sets: 15 x 5\"  Quad set w/ calf stretch - strap assist : 10 x 10 \"      R HS strap stretch: 3 x 30\" R hamstring stretch 3 x 30  SB hamstring curls: 30x  R hamstring stretch 3 x 30      R SLR's 30x Ankle pumps x 30 Supine strap calf stretch 3 x 30\" SB hamstring curls: 30     R s/l hip abd: 30x Supine strap calf stretch 3 x 30\" R hamstring stretch 3 x 30  Bridge walk outs (single leg) x 10      R TKE's: 15 x 5\" w/ ball press into wall SB hamstring curls (no bridge): 3 x 10  DL hamstring curls: 3 x 10 @ 42.5#.  HS curls (machine), DL 4 x 10 @ 37.5#     Single leg calf raises: 10x each L/R LAQ's dl; 37.5#, 3x8 LAQ's DL: 3 x 10 @ 42.5# LAQ's DL: 4 x 10 @ 45#     R lunge stretch: 2 x30\" DL hamstring curls : 3 x 8 @ 37.5# Calf raises off back of slantboard: 3 x 10  Calf raises: DL 2 x 10, single leg L/R 3 x 10       Dl calf raises off back of slant board; 2 x 10  Slantboard stretch x 2 min Forward step ups R on 6\" step : 2 x 10       Slantboard stretch x 2 min Tandem balance, 4 x 30\" (2x each L/R leading). Proprioceptive Activities:                                Manual Therapy: 4 min 10  min 10 min. 8 min     STM R lateral gastroc head x 4 min STM: lateral gastroc-popliteal- distal biceps fem. : 7  min STM: lateral gastroc-popliteal- distal biceps fem.  : 7  min STM: lateral gastroc-popliteal- distal biceps fem. : 8  min      Seated R tib-fem distraction/ap glides gr. 3: 3 min Seated R tib-fem distraction/ap glides gr. 3: 3 min     Therapeutic Activities:                                  HEP:Access Code: [email or web address]  URL: [email or web address]  Date: 03/08/2022  Prepared by: Wilkinson Dry    Exercises  Supine Heel Slide with Strap - 1 x daily - 7 x weekly - 3 sets - 30 second hold  Supine Hamstring Stretch with Strap - 1 x daily - 7 x weekly - 3 sets - 10 reps  Gastroc Stretch on Wall - 1 x daily - 7 x weekly - 3 sets - 30 seconds hold  Active Straight Leg Raise with Quad Set - 1 x daily - 7 x weekly - 3 sets - 10 reps  Sidelying Hip Abduction - 1 x daily - 7 x weekly - 3 sets - 10 reps  Standing Terminal Knee Extension at Wall with Ball - 1 x daily - 7 x weekly - 3 sets - 10 reps  Single Leg Heel Raise with Unilateral Counter Support - 1 x daily - 7 x weekly - 3 sets - 10 reps      [facility]  Treatment/Session Summary:    · Response to Treatment:  Progression to CKC training today - unsteady during 6\" step forward descent. · Communication/Consultation:  None today  · Equipment provided today:  None today  · Recommendations/Intent for next treatment session: Next visit will focus on  OKC/CKC program for quad/ham, hip and calf strength, single leg balance training. Modalities and manl therapy to ease calf soreness.  .    Total Treatment Billable Duration:  59 min  PT Patient Time In/Time Out  Time In: 0930  Time Out: [address], PT    Future Appointments   Date Time Provider Clifford Wagner   3/22/2022  9:30 AM Sanju Dempsey, PT [facility]   3/24/2022  9:30 AM Sanju Dempsey PT [facility]   3/29/2022  9:30 AM Sanju Dempsey PT [facility]   3/31/2022  9:30 AM Zakia Atkinson, PT [facility]

## 2022-03-22 ENCOUNTER — HOSPITAL ENCOUNTER (OUTPATIENT)
Dept: PHYSICAL THERAPY | Age: 57
Discharge: HOME | End: 2022-03-22
Payer: COMMERCIAL

## 2022-03-22 PROCEDURE — 97014 ELECTRIC STIMULATION THERAPY: CPT

## 2022-03-22 PROCEDURE — 97110 THERAPEUTIC EXERCISES: CPT

## 2022-03-22 NOTE — PROGRESS NOTES
Aura Osborn  : 1965  Primary: [facility]  Secondary:  [place] Adames @ [address]  [phone]   [phone]      OUTPATIENT PHYSICAL THERAPY: Daily Treatment Note  3/22/2022   ICD-10: Treatment Diagnosis: Pain in right knee (M25.561) and Stiffness of right knee, not elsewhere classified (M25.661) and Difficulty in walking, not elsewhere classified (R26.2)  MEDICAL/REFERRING DIAGNOSIS:  Pain in right knee [M25.561]    TREATMENT PLAN:  Effective Dates: 3/8/2022 TO 2022 (30 days). Frequency/Duration: 2 times a week for 30 Days  GOALS: (Goals have been discussed and agreed upon with patient.)  Short-Term Functional Goals: Time Frame: 2 weeks  1. Independent performance of home exercise program  2. Increase R knee flexion to 145 deg flexion for normal squat capacity  3. Pt to demonstrate normal/symmetrical squat technique. Discharge Goals: Time Frame: 4 weeks  1. Improve R quadriceps and calf strength 25% to allow more symmetrical squat capacity for lifting  2. Single leg balance 45 seconds or better for knee stance stability  3. Improve LEFS score to 65/80 or bettter to reflect normal age appropriate knee capacity. _________________________________________________________________________  Pre-treatment Symptoms/Complaints: Pt's knee continues to improve, no pain issues except during the first rep of LAQ's today. Pain: Initial: 0/10 Post Session:  No increase/10   Medications Last Reviewed:  3/22/2022  Updated Objective Findings:  Below measures from initial evaluation unless otherwise noted. Observation/Orthostatic Postural Assessment:    Pt stands with mild genu-valgus and bilateral knee hyperextension postures. Pelvic obliquity (left iliac crest lower). Palpation:    Mild medial joint line tenderness bilaterally. Right lateral gastrocnemius head/popliteal tenderness.    ROM:    Knee flexion/extension: left = 145 to +5, right = 138 deg to +5 deg. Hamstring flexibility (SLR): left = 65 deg, right = 50 deg. Dorsiflexion rocker (knee flexed, weightbearing): left = 44 deg, right = 45 deg. Strength:   Single leg calf endurance: 10 reps each side, but with forward rock over toes. HHD: quadriceps: left = 26.7 kg, right = 17 kg. Hamstrings: left = 19.2 kg, right = 18 kg. Special Tests:    Mild clicks with both left/right knee Deborah tests. Mild pseudolaxity with knee varus stressing L/R knees at 0 and 30 deg. Normal PF mobility. Neg. Bounce home, neg Lachmann tests bilat. Neurological Screen:  Normal LE light touch sensation bilat. Functional Mobility:   Performs sit - stand without hand assist. Performs half squat with weight shift to LLE. Balance:    Single leg balance = 15 secs each L/R, R side test performed with increased sway. 3/17/22: strengths: quads: left = 21kg, right= 18kg.   3/22/22: R quads = 20 kg. TREATMENT:   THERAPEUTIC ACTIVITY: ( see below for minutes): Therapeutic activities per grid below to improve mobility, strength, balance and coordination. Required minimal visual, verbal, manual and tactile cues to improve independence and safety with daily activities . THERAPEUTIC EXERCISE: (see below for minutes):  Exercises per grid below to improve mobility, strength, balance and coordination. Required minimal verbal and manual cues to promote proper body alignment, promote proper body posture and promote proper body mechanics. Progressed resistance, range, repetitions and complexity of movement as indicated. MANUAL THERAPY: (see below for minutes): Joint mobilization and Soft tissue mobilization was utilized and necessary because of the patient's restricted joint motion, painful spasm, loss of articular motion and restricted motion of soft tissue.    MODALITIES: (see below for minutes):      to decrease pain    SELF CARE: (see below for minutes): Procedure(s) (per grid) utilized to improve and/or restore self-care/home management as related to dressing, bathing and grooming. Required minimal verbal cueing to facilitate activities of daily living skills and compensatory activities.      Date: 3/8/22 (visit 1) 3/10/22 (visit 2)  3/15/22 (visit 3) 3/17/22 (visit 4) 3/22/22 (visit 5)   Modalities:  15 min 15 min 15 min 15 min     R popliteal, distal lateral HS, lateral calf IFC/ice x 15 min,  pps : 15 min R popliteal, distal lateral HS, lateral calf IFC/ice x 15 min,  pps : 15 min R popliteal, distal lateral HS, lateral calf IFC/ice x 15 min,  pps : 15 min R popliteal, distal lateral HS, lateral calf IFC/ice x 15 min,  pps : 15 min                   Therapeutic Exercise: 23 min 30 min 32 min 36 min 40 min    Heel slide knee flexion stretch: 2 x 30\"  Heel slide knee flexion stretch: 3 x 30\"  Quad sets: 15 x 5\"  Quad set w/ calf stretch - strap assist : 10 x 10 \"  Seated stepper x 8 min    R HS strap stretch: 3 x 30\" R hamstring stretch 3 x 30  SB hamstring curls: 30x  R hamstring stretch 3 x 30  R hamstring stretch 3 x 30     R SLR's 30x Ankle pumps x 30 Supine strap calf stretch 3 x 30\" SB hamstring curls: 30 R SLR  X 30 @ 4#    R s/l hip abd: 30x Supine strap calf stretch 3 x 30\" R hamstring stretch 3 x 30  Bridge walk outs (single leg) x 10  R sl hip abd x 30     R TKE's: 15 x 5\" w/ ball press into wall SB hamstring curls (no bridge): 3 x 10  DL hamstring curls: 3 x 10 @ 42.5#.  HS curls (machine), DL 4 x 10 @ 37.5# HS curls (machine), DL 4 x 10 @ 50#    Single leg calf raises: 10x each L/R LAQ's dl; 37.5#, 3x8 LAQ's DL: 3 x 10 @ 42.5# LAQ's DL: 4 x 10 @ 45# LAQ's DL: 4 x 10 @ 45#    R lunge stretch: 2 x30\" DL hamstring curls : 3 x 8 @ 37.5# Calf raises off back of slantboard: 3 x 10  Calf raises: DL 2 x 10, single leg L/R 3 x 10  Lateral step ups: R - 6\" step: 3 x 8      Dl calf raises off back of slant board; 2 x 10  Slantboard stretch x 2 min Forward step ups R on 6\" step : 2 x 10       Slantboard stretch x 2 min Tandem balance, 4 x 30\" (2x each L/R leading). Proprioceptive Activities:                                Manual Therapy: 4 min 10  min 10 min. 8 min 3 min    STM R lateral gastroc head x 4 min STM: lateral gastroc-popliteal- distal biceps fem. : 7  min STM: lateral gastroc-popliteal- distal biceps fem. : 7  min STM: lateral gastroc-popliteal- distal biceps fem. : 8  min STM: lateral gastroc-popliteal- distal biceps fem. : 3  min     Seated R tib-fem distraction/ap glides gr. 3: 3 min Seated R tib-fem distraction/ap glides gr. 3: 3 min     Therapeutic Activities:                                  HEP:Access Code: [email or web address]  URL: [email or web address]  Date: 03/08/2022  Prepared by: Valorie Rhoades    Exercises  Supine Heel Slide with Strap - 1 x daily - 7 x weekly - 3 sets - 30 second hold  Supine Hamstring Stretch with Strap - 1 x daily - 7 x weekly - 3 sets - 10 reps  Gastroc Stretch on Wall - 1 x daily - 7 x weekly - 3 sets - 30 seconds hold  Active Straight Leg Raise with Quad Set - 1 x daily - 7 x weekly - 3 sets - 10 reps  Sidelying Hip Abduction - 1 x daily - 7 x weekly - 3 sets - 10 reps  Standing Terminal Knee Extension at Wall with Ball - 1 x daily - 7 x weekly - 3 sets - 10 reps  Single Leg Heel Raise with Unilateral Counter Support - 1 x daily - 7 x weekly - 3 sets - 10 reps      [employer] [facility]  Treatment/Session Summary:    · Response to Treatment:  Improving quadriceps strength ( both via dynamometer and by capacity with lateral step ups. · Communication/Consultation:  None today  · Equipment provided today:  None today  · Recommendations/Intent for next treatment session: Next visit will focus on  [facility] program for quad/ham, hip and calf strength, single leg balance training. Modalities and manl therapy to ease calf soreness.  .    Total Treatment Billable Duration:  58 min  PT Patient Time In/Time Out  Time In: 0930  Time Out: [address], [place]    Future Appointments   Date Time Provider Clifford Kristy   3/24/2022  9:30 AM Saige Rowe, PT [facility]   3/29/2022  9:30 AM Saige Rowe PT [facility]   3/31/2022  9:30 AM Jeana Isaac, PT [facility]

## 2022-03-24 ENCOUNTER — HOSPITAL ENCOUNTER (OUTPATIENT)
Dept: PHYSICAL THERAPY | Age: 57
Discharge: HOME | End: 2022-03-24
Payer: COMMERCIAL

## 2022-03-24 PROCEDURE — 97110 THERAPEUTIC EXERCISES: CPT

## 2022-03-29 ENCOUNTER — HOSPITAL ENCOUNTER (OUTPATIENT)
Dept: PHYSICAL THERAPY | Age: 57
Discharge: HOME | End: 2022-03-29
Payer: COMMERCIAL

## 2022-03-29 NOTE — PROGRESS NOTES
Pt cancelled today's appointment due to illness, will return at her next scheduled appointment.      Frida Serna, PT,DPT,OCS,MTC

## 2022-03-31 ENCOUNTER — HOSPITAL ENCOUNTER (OUTPATIENT)
Dept: PHYSICAL THERAPY | Age: 57
Discharge: HOME | End: 2022-03-31
Payer: COMMERCIAL

## 2022-10-12 ENCOUNTER — HOSPITAL ENCOUNTER (OUTPATIENT)
Dept: MAMMOGRAPHY | Age: 57
Discharge: HOME | End: 2022-10-15
Payer: COMMERCIAL

## 2022-10-12 DIAGNOSIS — Z12.39 SCREENING BREAST EXAMINATION: ICD-10-CM

## 2022-10-12 PROCEDURE — 77063 BREAST TOMOSYNTHESIS BI: CPT

## 2023-06-07 SDOH — ECONOMIC STABILITY: INCOME INSECURITY: HOW HARD IS IT FOR YOU TO PAY FOR THE VERY BASICS LIKE FOOD, HOUSING, MEDICAL CARE, AND HEATING?: NOT HARD AT ALL

## 2023-06-07 SDOH — ECONOMIC STABILITY: FOOD INSECURITY: WITHIN THE PAST 12 MONTHS, THE FOOD YOU BOUGHT JUST DIDN'T LAST AND YOU DIDN'T HAVE MONEY TO GET MORE.: NEVER TRUE

## 2023-06-07 SDOH — ECONOMIC STABILITY: TRANSPORTATION INSECURITY
IN THE PAST 12 MONTHS, HAS LACK OF TRANSPORTATION KEPT YOU FROM MEETINGS, WORK, OR FROM GETTING THINGS NEEDED FOR DAILY LIVING?: NO

## 2023-06-07 SDOH — ECONOMIC STABILITY: FOOD INSECURITY: WITHIN THE PAST 12 MONTHS, YOU WORRIED THAT YOUR FOOD WOULD RUN OUT BEFORE YOU GOT MONEY TO BUY MORE.: NEVER TRUE

## 2023-06-07 SDOH — ECONOMIC STABILITY: HOUSING INSECURITY
IN THE LAST 12 MONTHS, WAS THERE A TIME WHEN YOU DID NOT HAVE A STEADY PLACE TO SLEEP OR SLEPT IN A SHELTER (INCLUDING NOW)?: NO

## 2023-06-07 ASSESSMENT — PATIENT HEALTH QUESTIONNAIRE - PHQ9
SUM OF ALL RESPONSES TO PHQ QUESTIONS 1-9: 0
SUM OF ALL RESPONSES TO PHQ9 QUESTIONS 1 & 2: 0
SUM OF ALL RESPONSES TO PHQ QUESTIONS 1-9: 0
SUM OF ALL RESPONSES TO PHQ QUESTIONS 1-9: 0
1. LITTLE INTEREST OR PLEASURE IN DOING THINGS: NOT AT ALL
SUM OF ALL RESPONSES TO PHQ9 QUESTIONS 1 & 2: 0
2. FEELING DOWN, DEPRESSED OR HOPELESS: 0
1. LITTLE INTEREST OR PLEASURE IN DOING THINGS: 0
SUM OF ALL RESPONSES TO PHQ QUESTIONS 1-9: 0
2. FEELING DOWN, DEPRESSED OR HOPELESS: NOT AT ALL

## 2023-06-09 ENCOUNTER — OFFICE VISIT (OUTPATIENT)
Dept: INTERNAL MEDICINE CLINIC | Facility: CLINIC | Age: 58
End: 2023-06-09
Payer: COMMERCIAL

## 2023-06-09 VITALS
OXYGEN SATURATION: 98 % | SYSTOLIC BLOOD PRESSURE: 118 MMHG | TEMPERATURE: 98.4 F | WEIGHT: 211.5 LBS | DIASTOLIC BLOOD PRESSURE: 80 MMHG | HEIGHT: 67 IN | HEART RATE: 60 BPM | BODY MASS INDEX: 33.19 KG/M2

## 2023-06-09 DIAGNOSIS — Z82.49 FAMILY HISTORY OF CORONARY ARTERY DISEASE IN FATHER: ICD-10-CM

## 2023-06-09 DIAGNOSIS — E78.2 MIXED HYPERLIPIDEMIA: ICD-10-CM

## 2023-06-09 DIAGNOSIS — U09.9 POST-COVID CHRONIC COUGH: ICD-10-CM

## 2023-06-09 DIAGNOSIS — R11.2 NAUSEA AND VOMITING, UNSPECIFIED VOMITING TYPE: ICD-10-CM

## 2023-06-09 DIAGNOSIS — R05.3 POST-COVID CHRONIC COUGH: ICD-10-CM

## 2023-06-09 DIAGNOSIS — R11.2 NAUSEA AND VOMITING, UNSPECIFIED VOMITING TYPE: Primary | ICD-10-CM

## 2023-06-09 LAB
ALBUMIN SERPL-MCNC: 4.2 G/DL (ref 3.5–5)
ALBUMIN/GLOB SERPL: 1.2 (ref 0.4–1.6)
ALP SERPL-CCNC: 131 U/L (ref 50–136)
ALT SERPL-CCNC: 23 U/L (ref 12–65)
ANION GAP SERPL CALC-SCNC: 6 MMOL/L (ref 2–11)
AST SERPL-CCNC: 16 U/L (ref 15–37)
BASOPHILS # BLD: 0.1 K/UL (ref 0–0.2)
BASOPHILS NFR BLD: 1 % (ref 0–2)
BILIRUB SERPL-MCNC: 0.6 MG/DL (ref 0.2–1.1)
BUN SERPL-MCNC: 15 MG/DL (ref 6–23)
CALCIUM SERPL-MCNC: 9.5 MG/DL (ref 8.3–10.4)
CHLORIDE SERPL-SCNC: 107 MMOL/L (ref 101–110)
CHOLEST SERPL-MCNC: 222 MG/DL
CO2 SERPL-SCNC: 28 MMOL/L (ref 21–32)
CREAT SERPL-MCNC: 0.8 MG/DL (ref 0.6–1)
DIFFERENTIAL METHOD BLD: ABNORMAL
EOSINOPHIL # BLD: 0.1 K/UL (ref 0–0.8)
EOSINOPHIL NFR BLD: 1 % (ref 0.5–7.8)
ERYTHROCYTE [DISTWIDTH] IN BLOOD BY AUTOMATED COUNT: 13.1 % (ref 11.9–14.6)
GLOBULIN SER CALC-MCNC: 3.5 G/DL (ref 2.8–4.5)
GLUCOSE SERPL-MCNC: 83 MG/DL (ref 65–100)
HCT VFR BLD AUTO: 41.5 % (ref 35.8–46.3)
HDLC SERPL-MCNC: 63 MG/DL (ref 40–60)
HDLC SERPL: 3.5
HGB BLD-MCNC: 13 G/DL (ref 11.7–15.4)
IMM GRANULOCYTES # BLD AUTO: 0 K/UL (ref 0–0.5)
IMM GRANULOCYTES NFR BLD AUTO: 0 % (ref 0–5)
LDLC SERPL CALC-MCNC: 138.4 MG/DL
LYMPHOCYTES # BLD: 2.1 K/UL (ref 0.5–4.6)
LYMPHOCYTES NFR BLD: 35 % (ref 13–44)
MCH RBC QN AUTO: 28.9 PG (ref 26.1–32.9)
MCHC RBC AUTO-ENTMCNC: 31.3 G/DL (ref 31.4–35)
MCV RBC AUTO: 92.2 FL (ref 82–102)
MONOCYTES # BLD: 0.4 K/UL (ref 0.1–1.3)
MONOCYTES NFR BLD: 7 % (ref 4–12)
NEUTS SEG # BLD: 3.4 K/UL (ref 1.7–8.2)
NEUTS SEG NFR BLD: 56 % (ref 43–78)
NRBC # BLD: 0 K/UL (ref 0–0.2)
PLATELET # BLD AUTO: 341 K/UL (ref 150–450)
PMV BLD AUTO: 10.6 FL (ref 9.4–12.3)
POTASSIUM SERPL-SCNC: 4.7 MMOL/L (ref 3.5–5.1)
PROT SERPL-MCNC: 7.7 G/DL (ref 6.3–8.2)
RBC # BLD AUTO: 4.5 M/UL (ref 4.05–5.2)
SODIUM SERPL-SCNC: 141 MMOL/L (ref 133–143)
TRIGL SERPL-MCNC: 103 MG/DL (ref 35–150)
TSH, 3RD GENERATION: 2.12 UIU/ML (ref 0.36–3.74)
VLDLC SERPL CALC-MCNC: 20.6 MG/DL (ref 6–23)
WBC # BLD AUTO: 6 K/UL (ref 4.3–11.1)

## 2023-06-09 PROCEDURE — 99214 OFFICE O/P EST MOD 30 MIN: CPT | Performed by: PHYSICIAN ASSISTANT

## 2023-06-09 RX ORDER — OMEPRAZOLE 40 MG/1
40 CAPSULE, DELAYED RELEASE ORAL
Qty: 30 CAPSULE | Refills: 0 | Status: SHIPPED | OUTPATIENT
Start: 2023-06-09

## 2023-06-09 ASSESSMENT — ENCOUNTER SYMPTOMS
CHEST TIGHTNESS: 0
VOMITING: 1
SHORTNESS OF BREATH: 0
ABDOMINAL PAIN: 0
SORE THROAT: 0
DIARRHEA: 0
VOICE CHANGE: 0
COUGH: 0
CONSTIPATION: 0
COLOR CHANGE: 0
WHEEZING: 0
EYE PAIN: 0
NAUSEA: 1

## 2023-06-09 NOTE — PROGRESS NOTES
Nose normal.   Eyes:      General:         Right eye: No discharge. Left eye: No discharge. Extraocular Movements: Extraocular movements intact. Conjunctiva/sclera: Conjunctivae normal.   Cardiovascular:      Rate and Rhythm: Normal rate and regular rhythm. Heart sounds: Normal heart sounds. No murmur heard. Pulmonary:      Effort: Pulmonary effort is normal.   Musculoskeletal:         General: Normal range of motion. Cervical back: Normal range of motion and neck supple. Skin:     Findings: No erythema or rash. Neurological:      General: No focal deficit present. Mental Status: She is alert and oriented to person, place, and time. Psychiatric:         Mood and Affect: Mood normal.        Medical problems and test results were reviewed with the patient today. No results found for this or any previous visit (from the past 672 hour(s)). ASSESSMENT and PLAN  Lucinda was seen today for nausea. Diagnoses and all orders for this visit:    Nausea and vomiting, unspecified vomiting type  Comments:  better, but will labs abd start PPI x 1-2 week and OTC probiotic, call pt with results  Orders:  -     Comprehensive Metabolic Panel; Future  -     CBC with Auto Differential; Future  -     omeprazole (PRILOSEC) 40 MG delayed release capsule; Take 1 capsule by mouth every morning (before breakfast)    Family history of coronary artery disease in father    Mixed hyperlipidemia  Comments:  labs and will address at CPE   Orders:  -     Lipid Panel; Future  -     TSH; Future    Post-COVID chronic cough  Comments:  covid in january- normal exam, concider CXR if persist        FOLLOW UP  Return in about 2 weeks (around 6/23/2023) for CPE labs and CPE 1 week after.

## 2023-07-03 ENCOUNTER — OFFICE VISIT (OUTPATIENT)
Dept: INTERNAL MEDICINE CLINIC | Facility: CLINIC | Age: 58
End: 2023-07-03
Payer: COMMERCIAL

## 2023-07-03 VITALS
HEIGHT: 66 IN | OXYGEN SATURATION: 97 % | BODY MASS INDEX: 34.07 KG/M2 | DIASTOLIC BLOOD PRESSURE: 82 MMHG | HEART RATE: 64 BPM | WEIGHT: 212 LBS | SYSTOLIC BLOOD PRESSURE: 122 MMHG

## 2023-07-03 DIAGNOSIS — Z12.31 ENCOUNTER FOR SCREENING MAMMOGRAM FOR BREAST CANCER: ICD-10-CM

## 2023-07-03 DIAGNOSIS — Z00.00 ROUTINE GENERAL MEDICAL EXAMINATION AT HEALTH CARE FACILITY: Primary | ICD-10-CM

## 2023-07-03 DIAGNOSIS — E78.2 MIXED HYPERLIPIDEMIA: ICD-10-CM

## 2023-07-03 PROCEDURE — 99396 PREV VISIT EST AGE 40-64: CPT | Performed by: PHYSICIAN ASSISTANT

## 2023-07-03 RX ORDER — BACILLUS COAGULANS/INULIN 1B-250 MG
1 CAPSULE ORAL DAILY
COMMUNITY

## 2023-07-03 ASSESSMENT — PATIENT HEALTH QUESTIONNAIRE - PHQ9
SUM OF ALL RESPONSES TO PHQ QUESTIONS 1-9: 0
SUM OF ALL RESPONSES TO PHQ9 QUESTIONS 1 & 2: 0
1. LITTLE INTEREST OR PLEASURE IN DOING THINGS: NOT AT ALL
SUM OF ALL RESPONSES TO PHQ9 QUESTIONS 1 & 2: 0
SUM OF ALL RESPONSES TO PHQ QUESTIONS 1-9: 0
SUM OF ALL RESPONSES TO PHQ QUESTIONS 1-9: 0
2. FEELING DOWN, DEPRESSED OR HOPELESS: 0
2. FEELING DOWN, DEPRESSED OR HOPELESS: NOT AT ALL
SUM OF ALL RESPONSES TO PHQ QUESTIONS 1-9: 0
1. LITTLE INTEREST OR PLEASURE IN DOING THINGS: 0

## 2023-07-03 NOTE — PROGRESS NOTES
PROGRESS NOTE    Chief Complaint   Patient presents with    Annual Exam     Pt here for CPE     Results     Labs were done 6/9/23    Cough     Pt feels she has a on going cough and feels cough maybe allergy related         HPI  HPI    Past Medical History, Past Surgical History, Family history, Social History, and Medications were all reviewed with the patient today and updated as necessary. Current Outpatient Medications   Medication Sig Dispense Refill    Bacillus Coagulans-Inulin (PROBIOTIC) 1-250 BILLION-MG CAPS Take 1 capsule by mouth daily       No current facility-administered medications for this visit. No Known Allergies  There is no problem list on file for this patient. Past Medical History:   Diagnosis Date    H/O tooth extraction 04/29/2019    and bone graft for possible implant at a later date. Finished Amoxicillin 5/8/19 and no longer taking Hydrocodone for pain.      Past Surgical History:   Procedure Laterality Date    BREAST BIOPSY      COLONOSCOPY N/A 5/10/2019    COLONOSCOPY/ 28 performed by Keturah Boyd MD at [facility] ENDOSCOPY    COLSC FLX W/REMOVAL LESION BY HOT BX FORCEPS  5/10/2019         CYST INCISION AND DRAINAGE Right     breast cyst, U/S 10/2009    HEENT  04/30/2019    oral surgery (tooth extraction, bone graft for possible implant)     Family History   Problem Relation Age of Onset    Hypertension Father     Diabetes Father     Heart Disease Father     Cancer Father         prostate cancer    High Blood Pressure Father     Breast Cancer Paternal Aunt     Hypertension Sister     Diabetes Sister     Cancer Maternal Grandmother         ovarian cancer    Breast Cancer Paternal Grandfather         and prostate cancer    Breast Cancer Maternal Aunt     Diabetes Maternal Aunt     Diabetes Maternal Uncle      Social History     Tobacco Use    Smoking status: Never    Smokeless tobacco: Never   Substance Use Topics    Alcohol use: Yes         ROS  Review of Systems

## 2023-07-04 ASSESSMENT — ENCOUNTER SYMPTOMS
SHORTNESS OF BREATH: 0
COUGH: 0
CHEST TIGHTNESS: 0
VOICE CHANGE: 0
NAUSEA: 0
SORE THROAT: 0
EYE PAIN: 0
COLOR CHANGE: 0
WHEEZING: 0
DIARRHEA: 0
VOMITING: 0
ABDOMINAL PAIN: 0
CONSTIPATION: 0

## 2023-10-20 ENCOUNTER — HOSPITAL ENCOUNTER (OUTPATIENT)
Dept: MAMMOGRAPHY | Age: 58
Discharge: HOME | End: 2023-10-20
Payer: COMMERCIAL

## 2023-10-20 DIAGNOSIS — Z12.31 ENCOUNTER FOR SCREENING MAMMOGRAM FOR BREAST CANCER: ICD-10-CM

## 2023-10-20 PROCEDURE — 77063 BREAST TOMOSYNTHESIS BI: CPT

## 2023-12-13 DIAGNOSIS — E78.2 MIXED HYPERLIPIDEMIA: ICD-10-CM

## 2023-12-13 LAB
ALBUMIN SERPL-MCNC: 3.6 G/DL (ref 3.5–5)
ALBUMIN/GLOB SERPL: 1 (ref 0.4–1.6)
ALP SERPL-CCNC: 114 U/L (ref 50–136)
ALT SERPL-CCNC: 30 U/L (ref 12–65)
ANION GAP SERPL CALC-SCNC: 7 MMOL/L (ref 2–11)
AST SERPL-CCNC: 17 U/L (ref 15–37)
BILIRUB SERPL-MCNC: 0.4 MG/DL (ref 0.2–1.1)
BUN SERPL-MCNC: 16 MG/DL (ref 6–23)
CALCIUM SERPL-MCNC: 9 MG/DL (ref 8.3–10.4)
CHLORIDE SERPL-SCNC: 110 MMOL/L (ref 103–113)
CHOLEST SERPL-MCNC: 220 MG/DL
CO2 SERPL-SCNC: 25 MMOL/L (ref 21–32)
CREAT SERPL-MCNC: 0.8 MG/DL (ref 0.6–1)
GLOBULIN SER CALC-MCNC: 3.7 G/DL (ref 2.8–4.5)
GLUCOSE SERPL-MCNC: 100 MG/DL (ref 65–100)
HDLC SERPL-MCNC: 50 MG/DL (ref 40–60)
HDLC SERPL: 4.4
LDLC SERPL CALC-MCNC: 139.4 MG/DL
POTASSIUM SERPL-SCNC: 4 MMOL/L (ref 3.5–5.1)
PROT SERPL-MCNC: 7.3 G/DL (ref 6.3–8.2)
SODIUM SERPL-SCNC: 142 MMOL/L (ref 136–146)
TRIGL SERPL-MCNC: 153 MG/DL (ref 35–150)
VLDLC SERPL CALC-MCNC: 30.6 MG/DL (ref 6–23)

## 2024-04-14 DIAGNOSIS — E78.2 MIXED HYPERLIPIDEMIA: ICD-10-CM

## 2024-04-15 RX ORDER — ROSUVASTATIN CALCIUM 10 MG/1
10 TABLET, COATED ORAL NIGHTLY
Qty: 90 TABLET | Refills: 1 | OUTPATIENT
Start: 2024-04-15

## 2024-04-16 DIAGNOSIS — E78.2 MIXED HYPERLIPIDEMIA: ICD-10-CM

## 2024-04-16 RX ORDER — ROSUVASTATIN CALCIUM 10 MG/1
10 TABLET, COATED ORAL NIGHTLY
Qty: 90 TABLET | Refills: 1 | OUTPATIENT
Start: 2024-04-16

## 2024-04-17 DIAGNOSIS — E78.2 MIXED HYPERLIPIDEMIA: ICD-10-CM

## 2024-04-17 RX ORDER — ROSUVASTATIN CALCIUM 10 MG/1
10 TABLET, COATED ORAL NIGHTLY
Qty: 90 TABLET | Refills: 0 | Status: SHIPPED | OUTPATIENT
Start: 2024-04-17

## 2024-07-05 DIAGNOSIS — E78.2 MIXED HYPERLIPIDEMIA: ICD-10-CM

## 2024-07-05 LAB
ALBUMIN SERPL-MCNC: 3.8 G/DL (ref 3.2–4.6)
ALBUMIN/GLOB SERPL: 1.3 (ref 1–1.9)
ALP SERPL-CCNC: 102 U/L (ref 35–104)
ALT SERPL-CCNC: 20 U/L (ref 12–65)
ANION GAP SERPL CALC-SCNC: 9 MMOL/L (ref 9–18)
AST SERPL-CCNC: 24 U/L (ref 15–37)
BASOPHILS # BLD: 0.1 K/UL (ref 0–0.2)
BASOPHILS NFR BLD: 1 % (ref 0–2)
BILIRUB SERPL-MCNC: 0.5 MG/DL (ref 0–1.2)
BUN SERPL-MCNC: 14 MG/DL (ref 8–23)
CALCIUM SERPL-MCNC: 9.4 MG/DL (ref 8.8–10.2)
CHLORIDE SERPL-SCNC: 104 MMOL/L (ref 98–107)
CHOLEST SERPL-MCNC: 131 MG/DL (ref 0–200)
CO2 SERPL-SCNC: 27 MMOL/L (ref 20–28)
CREAT SERPL-MCNC: 1 MG/DL (ref 0.6–1.1)
DIFFERENTIAL METHOD BLD: ABNORMAL
EOSINOPHIL # BLD: 0.1 K/UL (ref 0–0.8)
EOSINOPHIL NFR BLD: 2 % (ref 0.5–7.8)
ERYTHROCYTE [DISTWIDTH] IN BLOOD BY AUTOMATED COUNT: 13 % (ref 11.9–14.6)
GLOBULIN SER CALC-MCNC: 2.9 G/DL (ref 2.3–3.5)
GLUCOSE SERPL-MCNC: 101 MG/DL (ref 70–99)
HCT VFR BLD AUTO: 37.6 % (ref 35.8–46.3)
HDLC SERPL-MCNC: 51 MG/DL (ref 40–60)
HDLC SERPL: 2.6 (ref 0–5)
HGB BLD-MCNC: 11.7 G/DL (ref 11.7–15.4)
IMM GRANULOCYTES # BLD AUTO: 0 K/UL (ref 0–0.5)
IMM GRANULOCYTES NFR BLD AUTO: 0 % (ref 0–5)
LDLC SERPL CALC-MCNC: 57 MG/DL (ref 0–100)
LYMPHOCYTES # BLD: 2.3 K/UL (ref 0.5–4.6)
LYMPHOCYTES NFR BLD: 35 % (ref 13–44)
MCH RBC QN AUTO: 28.6 PG (ref 26.1–32.9)
MCHC RBC AUTO-ENTMCNC: 31.1 G/DL (ref 31.4–35)
MCV RBC AUTO: 91.9 FL (ref 82–102)
MONOCYTES # BLD: 0.5 K/UL (ref 0.1–1.3)
MONOCYTES NFR BLD: 7 % (ref 4–12)
NEUTS SEG # BLD: 3.7 K/UL (ref 1.7–8.2)
NEUTS SEG NFR BLD: 55 % (ref 43–78)
NRBC # BLD: 0 K/UL (ref 0–0.2)
PLATELET # BLD AUTO: 262 K/UL (ref 150–450)
PMV BLD AUTO: 10.8 FL (ref 9.4–12.3)
POTASSIUM SERPL-SCNC: 3.9 MMOL/L (ref 3.5–5.1)
PROT SERPL-MCNC: 6.7 G/DL (ref 6.3–8.2)
RBC # BLD AUTO: 4.09 M/UL (ref 4.05–5.2)
SODIUM SERPL-SCNC: 140 MMOL/L (ref 136–145)
TRIGL SERPL-MCNC: 114 MG/DL (ref 0–150)
TSH, 3RD GENERATION: 3.42 UIU/ML (ref 0.27–4.2)
VLDLC SERPL CALC-MCNC: 23 MG/DL (ref 6–23)
WBC # BLD AUTO: 6.7 K/UL (ref 4.3–11.1)

## 2024-07-05 ASSESSMENT — PATIENT HEALTH QUESTIONNAIRE - PHQ9
SUM OF ALL RESPONSES TO PHQ9 QUESTIONS 1 & 2: 0
SUM OF ALL RESPONSES TO PHQ QUESTIONS 1-9: 0
2. FEELING DOWN, DEPRESSED OR HOPELESS: NOT AT ALL
SUM OF ALL RESPONSES TO PHQ QUESTIONS 1-9: 0
1. LITTLE INTEREST OR PLEASURE IN DOING THINGS: NOT AT ALL
1. LITTLE INTEREST OR PLEASURE IN DOING THINGS: NOT AT ALL
SUM OF ALL RESPONSES TO PHQ9 QUESTIONS 1 & 2: 0
2. FEELING DOWN, DEPRESSED OR HOPELESS: NOT AT ALL
SUM OF ALL RESPONSES TO PHQ QUESTIONS 1-9: 0
SUM OF ALL RESPONSES TO PHQ QUESTIONS 1-9: 0

## 2024-07-05 NOTE — PROGRESS NOTES
PROGRESS NOTE    Chief Complaint   Patient presents with    Annual Exam     CPE, review labs    Cough     Cough for over a year now        HPI  HPI    Past Medical History, Past Surgical History, Family history, Social History, and Medications were all reviewed with the patient today and updated as necessary.       Current Outpatient Medications   Medication Sig Dispense Refill    rosuvastatin (CRESTOR) 10 MG tablet Take 1 tablet by mouth nightly 90 tablet 1    cetirizine (ZYRTEC) 10 MG tablet Take 1 tablet by mouth daily       No current facility-administered medications for this visit.     No Known Allergies  There is no problem list on file for this patient.    Past Medical History:   Diagnosis Date    H/O tooth extraction 04/29/2019    and bone graft for possible implant at a later date.  Finished Amoxicillin 5/8/19 and no longer taking Hydrocodone for pain.     Past Surgical History:   Procedure Laterality Date    BREAST BIOPSY      COLONOSCOPY N/A 5/10/2019    COLONOSCOPY/ 32 performed by Bryan Bright MD at [facility] ENDOSCOPY    COLSC FLX W/REMOVAL LESION BY HOT BX FORCEPS  5/10/2019         CYST INCISION AND DRAINAGE Right     breast cyst, U/S 10/2009    HEENT  04/30/2019    oral surgery (tooth extraction, bone graft for possible implant)     Family History   Problem Relation Age of Onset    Hypertension Father     Diabetes Father 69    Heart Disease Father 67    Cancer Father         prostate cancer    High Blood Pressure Father     Hypertension Sister     Diabetes Sister     Cancer Maternal Grandmother         ovarian cancer    Breast Cancer Paternal Grandfather 76        and prostate cancer    Breast Cancer Maternal Aunt 78    Diabetes Maternal Aunt     Diabetes Maternal Uncle     Breast Cancer Paternal Aunt      Social History     Tobacco Use    Smoking status: Never    Smokeless tobacco: Never   Substance Use Topics    Alcohol use: Yes         ROS  Review of Systems   Constitutional:  Negative for

## 2024-07-08 ENCOUNTER — OFFICE VISIT (OUTPATIENT)
Dept: INTERNAL MEDICINE CLINIC | Facility: CLINIC | Age: 59
End: 2024-07-08
Payer: COMMERCIAL

## 2024-07-08 VITALS
SYSTOLIC BLOOD PRESSURE: 126 MMHG | DIASTOLIC BLOOD PRESSURE: 82 MMHG | HEIGHT: 66 IN | BODY MASS INDEX: 33.75 KG/M2 | WEIGHT: 210 LBS

## 2024-07-08 DIAGNOSIS — E78.2 MIXED HYPERLIPIDEMIA: ICD-10-CM

## 2024-07-08 DIAGNOSIS — Z12.31 ENCOUNTER FOR SCREENING MAMMOGRAM FOR BREAST CANCER: ICD-10-CM

## 2024-07-08 DIAGNOSIS — R05.3 CHRONIC COUGH: ICD-10-CM

## 2024-07-08 DIAGNOSIS — L98.9 SKIN LESION: ICD-10-CM

## 2024-07-08 DIAGNOSIS — Z00.00 ROUTINE GENERAL MEDICAL EXAMINATION AT HEALTH CARE FACILITY: Primary | ICD-10-CM

## 2024-07-08 PROCEDURE — 99396 PREV VISIT EST AGE 40-64: CPT | Performed by: PHYSICIAN ASSISTANT

## 2024-07-08 RX ORDER — ROSUVASTATIN CALCIUM 10 MG/1
10 TABLET, COATED ORAL NIGHTLY
Qty: 90 TABLET | Refills: 1 | Status: SHIPPED | OUTPATIENT
Start: 2024-07-08

## 2024-07-08 SDOH — ECONOMIC STABILITY: FOOD INSECURITY: WITHIN THE PAST 12 MONTHS, THE FOOD YOU BOUGHT JUST DIDN'T LAST AND YOU DIDN'T HAVE MONEY TO GET MORE.: NEVER TRUE

## 2024-07-08 SDOH — ECONOMIC STABILITY: INCOME INSECURITY: HOW HARD IS IT FOR YOU TO PAY FOR THE VERY BASICS LIKE FOOD, HOUSING, MEDICAL CARE, AND HEATING?: NOT HARD AT ALL

## 2024-07-08 SDOH — ECONOMIC STABILITY: FOOD INSECURITY: WITHIN THE PAST 12 MONTHS, YOU WORRIED THAT YOUR FOOD WOULD RUN OUT BEFORE YOU GOT MONEY TO BUY MORE.: NEVER TRUE

## 2024-07-08 ASSESSMENT — ENCOUNTER SYMPTOMS
NAUSEA: 0
SHORTNESS OF BREATH: 0
WHEEZING: 0
DIARRHEA: 0
CHEST TIGHTNESS: 0
SORE THROAT: 0
VOMITING: 0
ABDOMINAL PAIN: 0
VOICE CHANGE: 0
COUGH: 0
EYE PAIN: 0
CONSTIPATION: 0
COLOR CHANGE: 0

## 2024-08-19 ENCOUNTER — OFFICE VISIT (OUTPATIENT)
Dept: PULMONOLOGY | Age: 59
End: 2024-08-19
Payer: COMMERCIAL

## 2024-08-19 VITALS
TEMPERATURE: 97.2 F | SYSTOLIC BLOOD PRESSURE: 114 MMHG | RESPIRATION RATE: 18 BRPM | HEIGHT: 66 IN | DIASTOLIC BLOOD PRESSURE: 78 MMHG | WEIGHT: 210 LBS | BODY MASS INDEX: 33.75 KG/M2 | HEART RATE: 61 BPM | OXYGEN SATURATION: 98 %

## 2024-08-19 DIAGNOSIS — Z91.09 ENVIRONMENTAL ALLERGIES: Chronic | ICD-10-CM

## 2024-08-19 DIAGNOSIS — R05.3 CHRONIC COUGH: Primary | ICD-10-CM

## 2024-08-19 DIAGNOSIS — J30.89 ENVIRONMENTAL AND SEASONAL ALLERGIES: ICD-10-CM

## 2024-08-19 LAB
EXPIRATORY TIME: NORMAL
FEF 25-75% %PRED-PRE: NORMAL
FEF 25-75% PRED: NORMAL
FEF 25-75-PRE: NORMAL
FEV1 %PRED-PRE: 82 %
FEV1 PRED: 2.64 L
FEV1/FVC %PRED-PRE: NORMAL
FEV1/FVC PRED: 97 %
FEV1/FVC: 77 %
FEV1: 2.18 L
FVC %PRED-PRE: 84 %
FVC PRED: 3.39 L
FVC: 2.84 L
PEF %PRED-PRE: NORMAL
PEF PRED: NORMAL
PEF-PRE: NORMAL

## 2024-08-19 PROCEDURE — 99204 OFFICE O/P NEW MOD 45 MIN: CPT | Performed by: STUDENT IN AN ORGANIZED HEALTH CARE EDUCATION/TRAINING PROGRAM

## 2024-08-19 PROCEDURE — 94010 BREATHING CAPACITY TEST: CPT | Performed by: STUDENT IN AN ORGANIZED HEALTH CARE EDUCATION/TRAINING PROGRAM

## 2024-08-19 RX ORDER — PANTOPRAZOLE SODIUM 40 MG/1
40 TABLET, DELAYED RELEASE ORAL DAILY
Qty: 90 TABLET | Refills: 6 | Status: SHIPPED | OUTPATIENT
Start: 2024-08-19

## 2024-08-19 RX ORDER — FAMOTIDINE 20 MG/1
20 TABLET, FILM COATED ORAL NIGHTLY
Qty: 60 TABLET | Refills: 6 | Status: SHIPPED | OUTPATIENT
Start: 2024-08-19

## 2024-08-19 RX ORDER — CHLOPHEDIANOL HCL AND PYRILAMINE MALEATE 12.5; 12.5 MG/5ML; MG/5ML
SOLUTION ORAL
Qty: 250 ML | Refills: 3 | Status: SHIPPED | OUTPATIENT
Start: 2024-08-19

## 2024-08-19 RX ORDER — ALBUTEROL SULFATE 90 UG/1
2 AEROSOL, METERED RESPIRATORY (INHALATION) EVERY 6 HOURS PRN
COMMUNITY

## 2024-08-19 ASSESSMENT — PULMONARY FUNCTION TESTS
FVC: 2.84
FVC_PREDICTED: 3.39
FVC_PERCENT_PREDICTED_PRE: 84
FEV1/FVC_PREDICTED: 97
FEV1/FVC: 77
FEV1: 2.18
FEV1_PERCENT_PREDICTED_PRE: 82
FEV1_PREDICTED: 2.64

## 2024-08-19 NOTE — PATIENT INSTRUCTIONS
Welcome to [facility]. It was a pleasure meeting you and participating in your health care today!  We will work together to figure out why you are coughing.      - take protonix 40 mg daily in the morning 30-45 mins before eating  - take pepcid 20 mg nightly before bed  - try this regimen for at least 6 weeks  - we will check the methacholine challenge test before next visit.     Please call our office or send me a [facility] message if you need anything or have any questions.   ISELA Tucker - NP

## 2024-08-19 NOTE — PROGRESS NOTES
institute nonpharmacologic measures for acid reflux as outlined below--  a.  Limit caffeine and alcohol consumption to one serving daily.  May need to eliminate totally if symptoms do not resolve.  b.  Avoid known triggers.  c.  Elevate head of bed on 6 inch blocks.  d.  Avoid eating and drinking within 2 hours of bedtime.  e.  Avoid spicy and fried foods.    The patient will also start a proton pump inhibitor daily as well as nightly Pepcid for at least 6-8 weeks.      If cough fails to resolve/improve with above measures will consider HRCT of chest and bronchoscopy for airway inspection, look for non-asthmatic eosinophilic bronchitis.    - Spirometry Without Bronchodilator  - Chlophedianol-Pyrilamine (NINJACOF) 12.5-12.5 MG/5ML LIQD; 2 teaspoon q6-8h, do not exceed 6 teaspoon in 24 hours  Dispense: 250 mL; Refill: 3  - pantoprazole (PROTONIX) 40 MG tablet; Take 1 tablet by mouth daily  Dispense: 90 tablet; Refill: 6  - famotidine (PEPCID) 20 MG tablet; Take 1 tablet by mouth nightly  Dispense: 60 tablet; Refill: 6    2. Environmental allergies  Continue as needed zyrtec at this time. Will schedule methacholine challenge testing to rule out asthma.     Orders Placed This Encounter   Medications    Chlophedianol-Pyrilamine (NINJACOF) 12.5-12.5 MG/5ML LIQD     Si teaspoon q6-8h, do not exceed 6 teaspoon in 24 hours     Dispense:  250 mL     Refill:  3    pantoprazole (PROTONIX) 40 MG tablet     Sig: Take 1 tablet by mouth daily     Dispense:  90 tablet     Refill:  6    famotidine (PEPCID) 20 MG tablet     Sig: Take 1 tablet by mouth nightly     Dispense:  60 tablet     Refill:  6     No orders of the defined types were placed in this encounter.    Follow-up and Dispositions    Return in about 3 months (around 2024) for jeanine Blanchard.       ISELA Tucker - NP      No specialty comments available.    No problem-specific Assessment & Plan notes found for this encounter.     Collaborating physician is Nina

## 2024-09-03 ENCOUNTER — NURSE ONLY (OUTPATIENT)
Dept: PULMONOLOGY | Age: 59
End: 2024-09-03
Payer: COMMERCIAL

## 2024-09-03 DIAGNOSIS — R05.3 CHRONIC COUGH: ICD-10-CM

## 2024-09-03 DIAGNOSIS — R05.3 CHRONIC COUGH: Primary | ICD-10-CM

## 2024-09-03 DIAGNOSIS — J45.909 UNCOMPLICATED ASTHMA, UNSPECIFIED ASTHMA SEVERITY, UNSPECIFIED WHETHER PERSISTENT: Primary | ICD-10-CM

## 2024-09-03 DIAGNOSIS — J45.909 UNCOMPLICATED ASTHMA, UNSPECIFIED ASTHMA SEVERITY, UNSPECIFIED WHETHER PERSISTENT: ICD-10-CM

## 2024-09-03 PROCEDURE — 95070 INHLJ BRNCL CHALLENGE TSTG: CPT | Performed by: INTERNAL MEDICINE

## 2024-09-03 PROCEDURE — 94070 EVALUATION OF WHEEZING: CPT | Performed by: INTERNAL MEDICINE

## 2024-09-03 RX ORDER — BUDESONIDE, GLYCOPYRROLATE, AND FORMOTEROL FUMARATE 160; 9; 4.8 UG/1; UG/1; UG/1
2 AEROSOL, METERED RESPIRATORY (INHALATION) 2 TIMES DAILY
Qty: 1 EACH | Refills: 11 | Status: CANCELLED | OUTPATIENT
Start: 2024-09-03

## 2024-09-03 NOTE — TELEPHONE ENCOUNTER
Patient came in for Methacholine challenge and tested positive. Dr. Pina gave patient Ronaldi sample and wants a script sent to pt pharmacy. PORTER

## 2024-09-03 NOTE — PROGRESS NOTES
Methacholine challenge performed. Pt stated she followed all instructions prior to testing. Results reviewed by Dr. Pina. Patient told test is positive for asthma. PORTER

## 2024-09-04 RX ORDER — BUDESONIDE, GLYCOPYRROLATE, AND FORMOTEROL FUMARATE 160; 9; 4.8 UG/1; UG/1; UG/1
2 AEROSOL, METERED RESPIRATORY (INHALATION) 2 TIMES DAILY
Qty: 1 EACH | Refills: 11 | Status: SHIPPED | OUTPATIENT
Start: 2024-09-04 | End: 2024-09-05 | Stop reason: ALTCHOICE

## 2024-09-05 ENCOUNTER — APPOINTMENT (RX ONLY)
Dept: URBAN - METROPOLITAN AREA CLINIC 329 | Facility: CLINIC | Age: 59
Setting detail: DERMATOLOGY
End: 2024-09-05

## 2024-09-05 DIAGNOSIS — D22 MELANOCYTIC NEVI: ICD-10-CM

## 2024-09-05 DIAGNOSIS — L82.1 OTHER SEBORRHEIC KERATOSIS: ICD-10-CM

## 2024-09-05 DIAGNOSIS — D18.0 HEMANGIOMA: ICD-10-CM

## 2024-09-05 DIAGNOSIS — J45.909 UNCOMPLICATED ASTHMA, UNSPECIFIED ASTHMA SEVERITY, UNSPECIFIED WHETHER PERSISTENT: Primary | ICD-10-CM

## 2024-09-05 DIAGNOSIS — L81.4 OTHER MELANIN HYPERPIGMENTATION: ICD-10-CM

## 2024-09-05 PROBLEM — D22.5 MELANOCYTIC NEVI OF TRUNK: Status: ACTIVE | Noted: 2024-09-05

## 2024-09-05 PROBLEM — D18.01 HEMANGIOMA OF SKIN AND SUBCUTANEOUS TISSUE: Status: ACTIVE | Noted: 2024-09-05

## 2024-09-05 PROCEDURE — 99203 OFFICE O/P NEW LOW 30 MIN: CPT

## 2024-09-05 PROCEDURE — ? FULL BODY SKIN EXAM

## 2024-09-05 PROCEDURE — ? COUNSELING

## 2024-09-05 RX ORDER — FLUTICASONE FUROATE 100 UG/1
1 POWDER RESPIRATORY (INHALATION) DAILY
Qty: 1 EACH | Refills: 11 | Status: SHIPPED | OUTPATIENT
Start: 2024-09-05

## 2024-09-05 RX ORDER — MONTELUKAST SODIUM 10 MG/1
10 TABLET ORAL DAILY
Qty: 30 TABLET | Refills: 11 | Status: SHIPPED | OUTPATIENT
Start: 2024-09-05

## 2024-09-05 ASSESSMENT — LOCATION DETAILED DESCRIPTION DERM
LOCATION DETAILED: RIGHT MEDIAL UPPER BACK
LOCATION DETAILED: LEFT MEDIAL UPPER BACK
LOCATION DETAILED: LEFT INFERIOR UPPER BACK
LOCATION DETAILED: INFERIOR THORACIC SPINE

## 2024-09-05 ASSESSMENT — LOCATION SIMPLE DESCRIPTION DERM
LOCATION SIMPLE: LEFT UPPER BACK
LOCATION SIMPLE: RIGHT UPPER BACK
LOCATION SIMPLE: UPPER BACK

## 2024-09-05 ASSESSMENT — LOCATION ZONE DERM: LOCATION ZONE: TRUNK

## 2024-11-20 ENCOUNTER — TELEPHONE (OUTPATIENT)
Dept: PULMONOLOGY | Age: 59
End: 2024-11-20

## 2024-11-26 NOTE — HPI: EVALUATION OF SKIN LESION(S)
Patient with Achilles tendinitis of the left lower extremity  Advise resting, can use Advil as needed.  Stretches and exercises-advised to continue  Referred to physical therapy-advised to go see  If without gradual improvement, consider ultrasound.  Follow-up as needed.  
Hpi Title: Evaluation of Skin Lesions
How Severe Are Your Spot(S)?: moderate

## 2025-01-02 DIAGNOSIS — E78.2 MIXED HYPERLIPIDEMIA: ICD-10-CM

## 2025-01-02 RX ORDER — ROSUVASTATIN CALCIUM 10 MG/1
10 TABLET, COATED ORAL NIGHTLY
Qty: 90 TABLET | Refills: 1 | Status: SHIPPED | OUTPATIENT
Start: 2025-01-02

## 2025-01-02 NOTE — TELEPHONE ENCOUNTER
Rx last written 7/8/24 for #90 with 1 refills. Pt last seen 7/8/24 and next appt is 7/9/25. Rx pended.

## 2025-07-08 DIAGNOSIS — E78.2 MIXED HYPERLIPIDEMIA: ICD-10-CM

## 2025-07-08 RX ORDER — ROSUVASTATIN CALCIUM 10 MG/1
10 TABLET, COATED ORAL NIGHTLY
Qty: 90 TABLET | Refills: 1 | OUTPATIENT
Start: 2025-07-08

## 2025-07-08 ASSESSMENT — PATIENT HEALTH QUESTIONNAIRE - PHQ9
SUM OF ALL RESPONSES TO PHQ QUESTIONS 1-9: 0
2. FEELING DOWN, DEPRESSED OR HOPELESS: NOT AT ALL
2. FEELING DOWN, DEPRESSED OR HOPELESS: NOT AT ALL
SUM OF ALL RESPONSES TO PHQ9 QUESTIONS 1 & 2: 0
1. LITTLE INTEREST OR PLEASURE IN DOING THINGS: NOT AT ALL
SUM OF ALL RESPONSES TO PHQ QUESTIONS 1-9: 0
1. LITTLE INTEREST OR PLEASURE IN DOING THINGS: NOT AT ALL

## 2025-07-08 NOTE — PROGRESS NOTES
PROGRESS NOTE    Chief Complaint   Patient presents with    Annual Exam     CPE, hasn't had labs yet. Fasting today    Asthma     Questions about inhaler and what she should she be taking        HPI  History of Present Illness  The patient presents for a physical exam and asthma management.    Asthma Management  - Diagnosed with asthma by a pulmonologist in September 2024  - Initially prescribed Breztri, later switched to Ellipta due to FDA approval issues  - Completed the course of Ellipta  - Has not needed to use her rescue inhaler frequently  - Has not undergone allergy testing  - Notes that her cat passed away in May 2025 and wonders if this has improved her condition    Preventive Care and Screenings  - Fasting since morning in preparation for her lab tests  - Due for a mammogram and plans to schedule it next week  - Believes her next colonoscopy is scheduled for when she turns 63  - Uncertain about the timing of her next Pap smear, last performed in 2020 and due this year  - Has not received the influenza vaccine this year  - Unsure about her COVID-19 booster status  - Performs self-breast exams monthly and reports no lumps or masses  - Regular dental check-ups twice a year  - Recently had a tooth extracted due to decay following a crack  - Uses sunscreen with an SPF of 30 on her body and 50 on her face  - Recent dermatology check-up showed no issues    General Health  - Reports normal bowel movements  - No presence of black, tarry stools, blood in stools, nausea, vomiting, diarrhea, urinary urgency, frequency, or burning during urination  - Continues to take Crestor as prescribed    SOCIAL HISTORY  She left her job as an  in October 2024.    FAMILY HISTORY  Her paternal grandfather had breast cancer and passed away from prostate cancer.      Past Medical History, Past Surgical History, Family history, Social History, and Medications were all reviewed with the patient today and updated as necessary.

## 2025-07-09 ENCOUNTER — OFFICE VISIT (OUTPATIENT)
Dept: INTERNAL MEDICINE CLINIC | Facility: CLINIC | Age: 60
End: 2025-07-09
Payer: COMMERCIAL

## 2025-07-09 VITALS
BODY MASS INDEX: 33.11 KG/M2 | WEIGHT: 206 LBS | HEIGHT: 66 IN | SYSTOLIC BLOOD PRESSURE: 118 MMHG | DIASTOLIC BLOOD PRESSURE: 84 MMHG

## 2025-07-09 DIAGNOSIS — E78.2 MIXED HYPERLIPIDEMIA: ICD-10-CM

## 2025-07-09 DIAGNOSIS — J45.20 MILD INTERMITTENT REACTIVE AIRWAY DISEASE WITHOUT COMPLICATION: ICD-10-CM

## 2025-07-09 DIAGNOSIS — E55.9 VITAMIN D DEFICIENCY: ICD-10-CM

## 2025-07-09 DIAGNOSIS — Z12.31 ENCOUNTER FOR SCREENING MAMMOGRAM FOR BREAST CANCER: ICD-10-CM

## 2025-07-09 DIAGNOSIS — Z78.0 MENOPAUSE: ICD-10-CM

## 2025-07-09 DIAGNOSIS — Z00.00 ROUTINE GENERAL MEDICAL EXAMINATION AT HEALTH CARE FACILITY: Primary | ICD-10-CM

## 2025-07-09 LAB
25(OH)D3 SERPL-MCNC: 29.9 NG/ML (ref 30–100)
ALBUMIN SERPL-MCNC: 3.8 G/DL (ref 3.2–4.6)
ALBUMIN/GLOB SERPL: 1 (ref 1–1.9)
ALP SERPL-CCNC: 119 U/L (ref 35–104)
ALT SERPL-CCNC: 20 U/L (ref 8–45)
ANION GAP SERPL CALC-SCNC: 12 MMOL/L (ref 7–16)
AST SERPL-CCNC: 27 U/L (ref 15–37)
BASOPHILS # BLD: 0.06 K/UL (ref 0–0.2)
BASOPHILS NFR BLD: 0.8 % (ref 0–2)
BILIRUB SERPL-MCNC: 0.4 MG/DL (ref 0–1.2)
BUN SERPL-MCNC: 17 MG/DL (ref 8–23)
CALCIUM SERPL-MCNC: 9.8 MG/DL (ref 8.8–10.2)
CHLORIDE SERPL-SCNC: 104 MMOL/L (ref 98–107)
CHOLEST SERPL-MCNC: 152 MG/DL (ref 0–200)
CO2 SERPL-SCNC: 26 MMOL/L (ref 20–29)
CREAT SERPL-MCNC: 0.91 MG/DL (ref 0.6–1.1)
DIFFERENTIAL METHOD BLD: ABNORMAL
EOSINOPHIL # BLD: 0.16 K/UL (ref 0–0.8)
EOSINOPHIL NFR BLD: 2.2 % (ref 0.5–7.8)
ERYTHROCYTE [DISTWIDTH] IN BLOOD BY AUTOMATED COUNT: 13.3 % (ref 11.9–14.6)
GLOBULIN SER CALC-MCNC: 3.7 G/DL (ref 2.3–3.5)
GLUCOSE SERPL-MCNC: 97 MG/DL (ref 70–99)
HCT VFR BLD AUTO: 41 % (ref 35.8–46.3)
HDLC SERPL-MCNC: 53 MG/DL (ref 40–60)
HDLC SERPL: 2.8 (ref 0–5)
HGB BLD-MCNC: 12.7 G/DL (ref 11.7–15.4)
IMM GRANULOCYTES # BLD AUTO: 0.02 K/UL (ref 0–0.5)
IMM GRANULOCYTES NFR BLD AUTO: 0.3 % (ref 0–5)
LDLC SERPL CALC-MCNC: 55 MG/DL (ref 0–100)
LYMPHOCYTES # BLD: 2.14 K/UL (ref 0.5–4.6)
LYMPHOCYTES NFR BLD: 29 % (ref 13–44)
MCH RBC QN AUTO: 27.4 PG (ref 26.1–32.9)
MCHC RBC AUTO-ENTMCNC: 31 G/DL (ref 31.4–35)
MCV RBC AUTO: 88.6 FL (ref 82–102)
MONOCYTES # BLD: 0.49 K/UL (ref 0.1–1.3)
MONOCYTES NFR BLD: 6.6 % (ref 4–12)
NEUTS SEG # BLD: 4.5 K/UL (ref 1.7–8.2)
NEUTS SEG NFR BLD: 61.1 % (ref 43–78)
NRBC # BLD: 0 K/UL (ref 0–0.2)
PLATELET # BLD AUTO: 299 K/UL (ref 150–450)
PMV BLD AUTO: 11.1 FL (ref 9.4–12.3)
POTASSIUM SERPL-SCNC: 4.3 MMOL/L (ref 3.5–5.1)
PROT SERPL-MCNC: 7.5 G/DL (ref 6.3–8.2)
RBC # BLD AUTO: 4.63 M/UL (ref 4.05–5.2)
SODIUM SERPL-SCNC: 142 MMOL/L (ref 136–145)
TRIGL SERPL-MCNC: 218 MG/DL (ref 0–150)
TSH, 3RD GENERATION: 3.78 UIU/ML (ref 0.27–4.2)
VLDLC SERPL CALC-MCNC: 44 MG/DL (ref 6–23)
WBC # BLD AUTO: 7.4 K/UL (ref 4.3–11.1)

## 2025-07-09 PROCEDURE — 99214 OFFICE O/P EST MOD 30 MIN: CPT | Performed by: PHYSICIAN ASSISTANT

## 2025-07-09 PROCEDURE — 99396 PREV VISIT EST AGE 40-64: CPT | Performed by: PHYSICIAN ASSISTANT

## 2025-07-09 RX ORDER — ROSUVASTATIN CALCIUM 10 MG/1
10 TABLET, COATED ORAL NIGHTLY
Qty: 90 TABLET | Refills: 1 | Status: SHIPPED | OUTPATIENT
Start: 2025-07-09

## 2025-07-09 SDOH — ECONOMIC STABILITY: FOOD INSECURITY: WITHIN THE PAST 12 MONTHS, THE FOOD YOU BOUGHT JUST DIDN'T LAST AND YOU DIDN'T HAVE MONEY TO GET MORE.: NEVER TRUE

## 2025-07-09 SDOH — ECONOMIC STABILITY: FOOD INSECURITY: WITHIN THE PAST 12 MONTHS, YOU WORRIED THAT YOUR FOOD WOULD RUN OUT BEFORE YOU GOT MONEY TO BUY MORE.: NEVER TRUE

## 2025-07-09 ASSESSMENT — ENCOUNTER SYMPTOMS
VOICE CHANGE: 0
SHORTNESS OF BREATH: 0
EYE PAIN: 0
SORE THROAT: 0
CHEST TIGHTNESS: 0
ABDOMINAL PAIN: 0
COLOR CHANGE: 0
NAUSEA: 0
COUGH: 0
DIARRHEA: 0
WHEEZING: 0
CONSTIPATION: 0
VOMITING: 0

## 2025-09-02 DIAGNOSIS — R05.3 CHRONIC COUGH: ICD-10-CM

## 2025-09-02 RX ORDER — PANTOPRAZOLE SODIUM 40 MG/1
40 TABLET, DELAYED RELEASE ORAL DAILY
Qty: 90 TABLET | Refills: 6 | Status: SHIPPED | OUTPATIENT
Start: 2025-09-02

## 2025-09-02 RX ORDER — FAMOTIDINE 20 MG/1
20 TABLET, FILM COATED ORAL NIGHTLY
Qty: 90 TABLET | Refills: 4 | Status: SHIPPED | OUTPATIENT
Start: 2025-09-02

## (undated) DEVICE — SYR 5ML 1/5 GRAD LL NSAF LF --

## (undated) DEVICE — REM POLYHESIVE ADULT PATIENT RETURN ELECTRODE: Brand: VALLEYLAB

## (undated) DEVICE — CANNULA NSL ORAL AD FOR CAPNOFLEX CO2 O2 AIRLFE

## (undated) DEVICE — CONNECTOR TBNG OD5-7MM O2 END DISP

## (undated) DEVICE — SYR 3ML LL TIP 1/10ML GRAD --

## (undated) DEVICE — CONTAINER PREFIL FRMLN 40ML --

## (undated) DEVICE — NDL PRT INJ NSAF BLNT 18GX1.5 --

## (undated) DEVICE — KENDALL RADIOLUCENT FOAM MONITORING ELECTRODE RECTANGULAR SHAPE: Brand: KENDALL

## (undated) DEVICE — FCPS BX HOT RJ4 2.2MMX240CM -- RADIAL JAW 4 BX/40